# Patient Record
Sex: FEMALE | Race: WHITE | Employment: OTHER | URBAN - METROPOLITAN AREA
[De-identification: names, ages, dates, MRNs, and addresses within clinical notes are randomized per-mention and may not be internally consistent; named-entity substitution may affect disease eponyms.]

---

## 2017-04-10 PROBLEM — R60.9 EDEMA: Status: ACTIVE | Noted: 2017-04-10

## 2017-04-10 PROBLEM — R73.9 HYPERGLYCEMIA: Status: ACTIVE | Noted: 2017-04-10

## 2017-04-10 PROBLEM — Z00.00 MEDICARE ANNUAL WELLNESS VISIT, SUBSEQUENT: Status: ACTIVE | Noted: 2017-04-10

## 2017-04-10 PROBLEM — I10 ESSENTIAL HYPERTENSION: Status: ACTIVE | Noted: 2017-04-10

## 2017-04-28 PROCEDURE — 99283 EMERGENCY DEPT VISIT LOW MDM: CPT | Performed by: EMERGENCY MEDICINE

## 2017-04-29 ENCOUNTER — HOSPITAL ENCOUNTER (EMERGENCY)
Age: 82
Discharge: HOME | End: 2017-04-29
Attending: EMERGENCY MEDICINE
Payer: MEDICARE

## 2017-04-29 ENCOUNTER — APPOINTMENT (OUTPATIENT)
Dept: CT IMAGING | Age: 82
End: 2017-04-29
Attending: EMERGENCY MEDICINE
Payer: MEDICARE

## 2017-04-29 VITALS
SYSTOLIC BLOOD PRESSURE: 152 MMHG | WEIGHT: 95 LBS | HEART RATE: 75 BPM | HEIGHT: 62 IN | RESPIRATION RATE: 20 BRPM | OXYGEN SATURATION: 95 % | DIASTOLIC BLOOD PRESSURE: 67 MMHG | TEMPERATURE: 98 F | BODY MASS INDEX: 17.48 KG/M2

## 2017-04-29 DIAGNOSIS — H81.10 BENIGN POSITIONAL VERTIGO, UNSPECIFIED LATERALITY: Primary | ICD-10-CM

## 2017-04-29 PROCEDURE — 70450 CT HEAD/BRAIN W/O DYE: CPT

## 2017-04-29 PROCEDURE — 74011250636 HC RX REV CODE- 250/636: Performed by: EMERGENCY MEDICINE

## 2017-04-29 RX ORDER — SODIUM CHLORIDE 0.9 % (FLUSH) 0.9 %
5-10 SYRINGE (ML) INJECTION EVERY 8 HOURS
Status: DISCONTINUED | OUTPATIENT
Start: 2017-04-29 | End: 2017-04-29 | Stop reason: HOSPADM

## 2017-04-29 RX ORDER — SODIUM CHLORIDE 0.9 % (FLUSH) 0.9 %
5-10 SYRINGE (ML) INJECTION AS NEEDED
Status: DISCONTINUED | OUTPATIENT
Start: 2017-04-29 | End: 2017-04-29 | Stop reason: HOSPADM

## 2017-04-29 RX ORDER — MECLIZINE HYDROCHLORIDE 25 MG/1
25 TABLET ORAL
Status: COMPLETED | OUTPATIENT
Start: 2017-04-29 | End: 2017-04-29

## 2017-04-29 RX ORDER — MECLIZINE HYDROCHLORIDE 25 MG/1
25 TABLET ORAL
Qty: 30 TAB | Refills: 0 | Status: SHIPPED | OUTPATIENT
Start: 2017-04-29 | End: 2018-10-15

## 2017-04-29 RX ADMIN — MECLIZINE HYDROCHLORIDE 25 MG: 25 TABLET ORAL at 02:21

## 2017-04-29 NOTE — ED NOTES
I have reviewed discharge instructions with the patient. The patient verbalized understanding. Prescriptions provided x1. Pt ambulatory at discharge with family.

## 2017-04-29 NOTE — ED PROVIDER NOTES
HPI Comments: Patient presents to the emerge department with a about a week history of vertigo. She reports symptoms of been intermittent but occurring daily and though increased frequency and intensity today. Symptoms are described as spinning which causes nausea and diaphoresis and is provoked with changes in head position such as bending over or standing up. She denies any headaches double vision lateralizing neurological complaints or disequilibrium. Patient is a 80 y.o. female presenting with dizziness. The history is provided by the patient. Dizziness   This is a new problem. The current episode started more than 1 week ago. The problem has been gradually worsening. There was no focality noted. Pertinent negatives include no focal weakness, no loss of sensation, no loss of balance, no slurred speech, no speech difficulty, no memory loss, no movement disorder, no agitation, no visual change, no mental status change, no unresponsiveness and no disorientation. There has been no fever. Pertinent negatives include no shortness of breath, no chest pain, no vomiting, no altered mental status, no confusion, no headaches, no choking, no nausea, no bowel incontinence and no bladder incontinence. There were no medications administered prior to arrival.        Past Medical History:   Diagnosis Date    Carotid artery disease ([facility]     GERD (gastroesophageal reflux disease)     HTN (hypertension)     Hyperlipidemia     Hypomagnesemia     [facility]     effecting left side. 2002       History reviewed. No pertinent surgical history. History reviewed. No pertinent family history. Social History     Social History    Marital status:      Spouse name: N/A    Number of children: N/A    Years of education: N/A     Occupational History    Not on file.      Social History Main Topics    Smoking status: Never Smoker    Smokeless tobacco: Never Used    Alcohol use No    Drug use: No    Sexual activity: No     Other Topics Concern    Not on file     Social History Narrative         ALLERGIES: Penicillins    Review of Systems   HENT: Negative for ear discharge, ear pain, hearing loss, postnasal drip and tinnitus. Respiratory: Negative for choking and shortness of breath. Cardiovascular: Negative for chest pain. Gastrointestinal: Negative for bowel incontinence, nausea and vomiting. Genitourinary: Negative for bladder incontinence. Neurological: Positive for dizziness. Negative for tremors, focal weakness, seizures, syncope, facial asymmetry, speech difficulty, weakness, light-headedness, numbness, headaches and loss of balance. Psychiatric/Behavioral: Negative for agitation, confusion and memory loss. All other systems reviewed and are negative. Vitals:    04/29/17 0009 04/29/17 0203   BP: 181/77 187/77   Pulse: (!) 102 75   Resp: 20    Temp: 97.9 °F (36.6 °C)    SpO2: 98% 96%   Weight: 43.1 kg (95 lb)    Height: 5' 2\" (1.575 m)             Physical Exam   Constitutional: She is oriented to person, place, and time. She appears well-developed and well-nourished. No distress. HENT:   Head: Normocephalic and atraumatic. Mouth/Throat: No oropharyngeal exudate. Eyes: Conjunctivae and EOM are normal. Pupils are equal, round, and reactive to light. No nystagmus is noted at rest however having the patient sit up and lie supine and turning the head quickly results and her nystagmus with a fast component to the right, also provoking the symptoms of vertigo. Neck: Normal range of motion. Neck supple. Cardiovascular: Normal rate, regular rhythm and normal heart sounds. Pulmonary/Chest: Effort normal and breath sounds normal.   Abdominal: Soft. Bowel sounds are normal.   Musculoskeletal: Normal range of motion. Neurological: She is alert and oriented to person, place, and time. Skin: Skin is warm and dry. Psychiatric: She has a normal mood and affect.  Her behavior is normal. Nursing note and vitals reviewed.        MDM  Number of Diagnoses or Management Options  Benign positional vertigo, unspecified laterality:      Amount and/or Complexity of Data Reviewed  Tests in the radiology section of CPT®: ordered and reviewed    Risk of Complications, Morbidity, and/or Mortality  Presenting problems: moderate  Diagnostic procedures: moderate  Management options: low    Patient Progress  Patient progress: stable    ED Course       Procedures

## 2017-04-29 NOTE — ED TRIAGE NOTES
Dizziness and nausea when lying back. States dizziness when leaning over also. States she has noticed same x 4 days. Also began vitamin D3 6 days ago.

## 2017-10-09 PROBLEM — Z23 FLU VACCINE NEED: Status: ACTIVE | Noted: 2017-10-09

## 2018-02-05 PROBLEM — R05.9 COUGH: Status: ACTIVE | Noted: 2018-02-05

## 2018-02-05 PROBLEM — J06.9 ACUTE URI: Status: ACTIVE | Noted: 2018-02-05

## 2018-02-05 PROBLEM — S41.112A LACERATION OF LEFT UPPER EXTREMITY: Status: ACTIVE | Noted: 2018-02-05

## 2018-07-24 PROBLEM — M54.50 ACUTE LOW BACK PAIN WITHOUT SCIATICA: Status: ACTIVE | Noted: 2018-07-24

## 2018-07-24 PROBLEM — G89.29 CHRONIC SI JOINT PAIN: Status: ACTIVE | Noted: 2018-07-24

## 2018-07-24 PROBLEM — M53.3 CHRONIC SI JOINT PAIN: Status: ACTIVE | Noted: 2018-07-24

## 2018-10-15 PROBLEM — M54.40 CHRONIC RIGHT-SIDED LOW BACK PAIN WITH SCIATICA: Status: ACTIVE | Noted: 2018-10-15

## 2018-10-15 PROBLEM — G89.29 CHRONIC RIGHT-SIDED LOW BACK PAIN WITH SCIATICA: Status: ACTIVE | Noted: 2018-10-15

## 2018-12-06 PROBLEM — M48.062 SPINAL STENOSIS OF LUMBAR REGION WITH NEUROGENIC CLAUDICATION: Status: ACTIVE | Noted: 2018-12-06

## 2019-03-29 PROBLEM — L65.9 HAIR LOSS: Status: ACTIVE | Noted: 2019-03-29

## 2019-06-16 ENCOUNTER — HOSPITAL ENCOUNTER (EMERGENCY)
Age: 84
Discharge: HOME | End: 2019-06-16
Attending: EMERGENCY MEDICINE
Payer: MEDICARE

## 2019-06-16 VITALS
SYSTOLIC BLOOD PRESSURE: 153 MMHG | HEIGHT: 60 IN | DIASTOLIC BLOOD PRESSURE: 67 MMHG | WEIGHT: 98 LBS | BODY MASS INDEX: 19.24 KG/M2 | OXYGEN SATURATION: 97 % | TEMPERATURE: 97.6 F | HEART RATE: 100 BPM | RESPIRATION RATE: 18 BRPM

## 2019-06-16 DIAGNOSIS — N39.0 URINARY TRACT INFECTION WITHOUT HEMATURIA, SITE UNSPECIFIED: Primary | ICD-10-CM

## 2019-06-16 DIAGNOSIS — N81.10 BLADDER PROLAPSE, FEMALE, ACQUIRED: ICD-10-CM

## 2019-06-16 LAB
BACTERIA URNS QL MICRO: ABNORMAL /HPF
CASTS URNS QL MICRO: ABNORMAL /LPF
EPI CELLS #/AREA URNS HPF: ABNORMAL /HPF
RBC #/AREA URNS HPF: ABNORMAL /HPF
WBC URNS QL MICRO: >100 /HPF

## 2019-06-16 PROCEDURE — 81003 URINALYSIS AUTO W/O SCOPE: CPT | Performed by: EMERGENCY MEDICINE

## 2019-06-16 PROCEDURE — 99283 EMERGENCY DEPT VISIT LOW MDM: CPT | Performed by: EMERGENCY MEDICINE

## 2019-06-16 PROCEDURE — 81001 URINALYSIS AUTO W/SCOPE: CPT

## 2019-06-16 RX ORDER — NITROFURANTOIN 25; 75 MG/1; MG/1
100 CAPSULE ORAL 2 TIMES DAILY
Qty: 10 CAP | Refills: 0 | Status: SHIPPED | OUTPATIENT
Start: 2019-06-16 | End: 2019-06-21

## 2019-06-16 NOTE — DISCHARGE INSTRUCTIONS

## 2019-06-16 NOTE — ED NOTES
I have reviewed discharge instructions with the patient. The patient verbalized understanding. Patient left ED via Discharge Method: ambulatory to Home with daughter. Opportunity for questions and clarification provided. Patient given 1 scripts. To continue your aftercare when you leave the hospital, you may receive an automated call from our care team to check in on how you are doing. This is a free service and part of our promise to provide the best care and service to meet your aftercare needs.  If you have questions, or wish to unsubscribe from this service please call [phone]. Thank you for Choosing our [address] [place] [address] Emergency Department.

## 2019-06-16 NOTE — ED PROVIDER NOTES
25-year-old  female presents to [facility], complaining of bladder prolapse over the last couple of weeks, slowly getting worse. Patient denies any dysuria but does describe some mild increased frequency. She is status post hysterectomy many years ago. She denies any change in bowel habits, melena or hematochezia. The history is provided by the patient and a relative. Other   This is a new problem. The current episode started more than 1 week ago. The problem occurs daily. The problem has been gradually worsening. Pertinent negatives include no chest pain, no abdominal pain, no headaches and no shortness of breath. The symptoms are aggravated by standing and walking. Nothing relieves the symptoms. She has tried nothing for the symptoms. The treatment provided no relief. Past Medical History:   Diagnosis Date    Arthritis     Carotid artery disease (HCC)     Chronic pain     GERD (gastroesophageal reflux disease)     HTN (hypertension)     Hyperlipidemia     Hypomagnesemia     [facility]     effecting left side.   2002       Past Surgical History:   Procedure Laterality Date    BREAST SURGERY PROCEDURE UNLISTED      HX CHOLECYSTECTOMY           Family History:   Problem Relation Age of Onset    Arthritis-osteo Mother     Hypertension Mother        Social History     Socioeconomic History    Marital status:      Spouse name: Not on file    Number of children: Not on file    Years of education: Not on file    Highest education level: Not on file   Occupational History    Not on file   Social Needs    Financial resource strain: Not on file    Food insecurity:     Worry: Not on file     Inability: Not on file    Transportation needs:     Medical: Not on file     Non-medical: Not on file   Tobacco Use    Smoking status: Never Smoker    Smokeless tobacco: Never Used   Substance and Sexual Activity    Alcohol use: No    Drug use: No    Sexual activity: Never   Lifestyle  Physical activity:     Days per week: Not on file     Minutes per session: Not on file    Stress: Not on file   Relationships    Social connections:     Talks on phone: Not on file     Gets together: Not on file     Attends Rastafari service: Not on file     Active member of club or organization: Not on file     Attends meetings of clubs or organizations: Not on file     Relationship status: Not on file    Intimate partner violence:     Fear of current or ex partner: Not on file     Emotionally abused: Not on file     Physically abused: Not on file     Forced sexual activity: Not on file   Other Topics Concern    Not on file   Social History Narrative    Not on file         ALLERGIES: Latex and Penicillins    Review of Systems   Constitutional: Negative for chills and fever. Respiratory: Negative for shortness of breath. Cardiovascular: Negative for chest pain. Gastrointestinal: Negative for abdominal pain. Genitourinary: Positive for frequency. Negative for dysuria and vaginal pain. Neurological: Negative for headaches. All other systems reviewed and are negative. Vitals:    06/16/19 1110   BP: 153/67   Pulse: 100   Resp: 18   Temp: 97.6 °F (36.4 °C)   SpO2: 97%   Weight: 44.5 kg (98 lb)   Height: 5' (1.524 m)            Physical Exam   Constitutional: She is oriented to person, place, and time. She appears well-developed and well-nourished. No distress. HENT:   Head: Normocephalic and atraumatic. Right Ear: External ear normal.   Left Ear: External ear normal.   Mouth/Throat: Oropharynx is clear and moist.   Eyes: Pupils are equal, round, and reactive to light. Conjunctivae and EOM are normal.   Neck: Normal range of motion. Neck supple. No thyromegaly present. Cardiovascular: Normal rate, regular rhythm, normal heart sounds and intact distal pulses. Pulmonary/Chest: Effort normal and breath sounds normal.   Abdominal: Soft.  Bowel sounds are normal. She exhibits no distension and no mass. There is no tenderness. There is no rebound and no guarding. No hernia. Genitourinary:   Genitourinary Comments: No evidence of flow prolapse outside of the external vaginal area. Positive internal prolapse of bladder   Musculoskeletal: Normal range of motion. She exhibits no edema. Neurological: She is alert and oriented to person, place, and time. Skin: Skin is warm and dry. Capillary refill takes less than 2 seconds. Psychiatric: She has a normal mood and affect. Nursing note and vitals reviewed. MDM  Number of Diagnoses or Management Options  Bladder prolapse, female, acquired: new and requires workup  Urinary tract infection without hematuria, site unspecified: new and requires workup     Amount and/or Complexity of Data Reviewed  Clinical lab tests: ordered and reviewed  Review and summarize past medical records: yes    Risk of Complications, Morbidity, and/or Mortality  Presenting problems: low  Diagnostic procedures: minimal  Management options: low    Patient Progress  Patient progress: stable         Procedures    Results Reviewed:      Recent Results (from the past 24 hour(s))   URINE MICROSCOPIC    Collection Time: 06/16/19 11:51 AM   Result Value Ref Range    WBC >100 (H) 0 /hpf    RBC 0-3 0 /hpf    Epithelial cells 0-3 0 /hpf    Bacteria 4+ (H) 0 /hpf    Casts 5-10 0 /lpf       I discussed the results of all labs, procedures, radiographs, and treatments with the patient and available family. Treatment plan is agreed upon and the patient is ready for discharge. All voiced understanding of the discharge plan and medication instructions or changes as appropriate. Questions about treatment in the ED were answered. All were encouraged to return should symptoms worsen or new problems develop.

## 2019-06-16 NOTE — ED TRIAGE NOTES
Pt states her bladder prolapsed a couple weeks ago and has since had worsening incontinence of urine. Denies dysuria or hematuria. States this has never happened before and has no urologic history. Denies pain to area. Denies vomiting or fever.

## 2019-09-24 PROBLEM — Z23 FLU VACCINE NEED: Status: RESOLVED | Noted: 2017-10-09 | Resolved: 2019-09-24

## 2019-10-15 PROBLEM — Z86.79 HISTORY OF CAROTID ARTERY DISEASE: Status: ACTIVE | Noted: 2019-10-15

## 2019-10-15 PROBLEM — R35.0 URINARY FREQUENCY: Status: ACTIVE | Noted: 2019-10-15

## 2022-03-18 PROBLEM — S41.112A LACERATION OF LEFT UPPER EXTREMITY: Status: ACTIVE | Noted: 2018-02-05

## 2022-03-18 PROBLEM — R73.9 HYPERGLYCEMIA: Status: ACTIVE | Noted: 2017-04-10

## 2022-03-18 PROBLEM — Z23 FLU VACCINE NEED: Status: ACTIVE | Noted: 2017-10-09

## 2022-03-18 PROBLEM — R35.0 URINARY FREQUENCY: Status: ACTIVE | Noted: 2019-10-15

## 2022-03-19 PROBLEM — M53.3 CHRONIC SI JOINT PAIN: Status: ACTIVE | Noted: 2018-07-24

## 2022-03-19 PROBLEM — M54.50 ACUTE LOW BACK PAIN WITHOUT SCIATICA: Status: ACTIVE | Noted: 2018-07-24

## 2022-03-19 PROBLEM — M48.062 SPINAL STENOSIS OF LUMBAR REGION WITH NEUROGENIC CLAUDICATION: Status: ACTIVE | Noted: 2018-12-06

## 2022-03-19 PROBLEM — Z86.79 HISTORY OF CAROTID ARTERY DISEASE: Status: ACTIVE | Noted: 2019-10-15

## 2022-03-19 PROBLEM — Z00.00 MEDICARE ANNUAL WELLNESS VISIT, SUBSEQUENT: Status: ACTIVE | Noted: 2017-04-10

## 2022-03-19 PROBLEM — I10 ESSENTIAL HYPERTENSION: Status: ACTIVE | Noted: 2017-04-10

## 2022-03-19 PROBLEM — L65.9 HAIR LOSS: Status: ACTIVE | Noted: 2019-03-29

## 2022-03-19 PROBLEM — G89.29 CHRONIC SI JOINT PAIN: Status: ACTIVE | Noted: 2018-07-24

## 2022-03-19 PROBLEM — G89.29 CHRONIC RIGHT-SIDED LOW BACK PAIN WITH SCIATICA: Status: ACTIVE | Noted: 2018-10-15

## 2022-03-19 PROBLEM — M54.40 CHRONIC RIGHT-SIDED LOW BACK PAIN WITH SCIATICA: Status: ACTIVE | Noted: 2018-10-15

## 2022-03-19 PROBLEM — R05.9 COUGH: Status: ACTIVE | Noted: 2018-02-05

## 2022-03-19 PROBLEM — J06.9 ACUTE URI: Status: ACTIVE | Noted: 2018-02-05

## 2022-03-19 PROBLEM — R60.9 EDEMA: Status: ACTIVE | Noted: 2017-04-10

## 2022-07-03 ENCOUNTER — APPOINTMENT (OUTPATIENT)
Dept: CT IMAGING | Age: 87
DRG: 544 | End: 2022-07-03
Payer: MEDICARE

## 2022-07-03 ENCOUNTER — APPOINTMENT (OUTPATIENT)
Dept: GENERAL RADIOLOGY | Age: 87
DRG: 544 | End: 2022-07-03
Payer: MEDICARE

## 2022-07-03 ENCOUNTER — HOSPITAL ENCOUNTER (INPATIENT)
Age: 87
LOS: 3 days | Discharge: NURSING, REHAB OR LONG-TERM CARE | DRG: 544 | End: 2022-07-07
Attending: STUDENT IN AN ORGANIZED HEALTH CARE EDUCATION/TRAINING PROGRAM
Payer: MEDICARE

## 2022-07-03 DIAGNOSIS — M80.051D PATHOLOGICAL FRACTURE OF RIGHT HIP DUE TO AGE-RELATED OSTEOPOROSIS WITH ROUTINE HEALING: ICD-10-CM

## 2022-07-03 DIAGNOSIS — M84.48XA SACRAL INSUFFICIENCY FRACTURE, INITIAL ENCOUNTER: Primary | ICD-10-CM

## 2022-07-03 PROBLEM — S72.141D: Status: ACTIVE | Noted: 2020-11-30

## 2022-07-03 PROBLEM — R63.6 UNDERWEIGHT: Status: ACTIVE | Noted: 2020-11-30

## 2022-07-03 PROBLEM — R35.0 URINARY FREQUENCY: Status: RESOLVED | Noted: 2019-10-15 | Resolved: 2022-07-03

## 2022-07-03 PROBLEM — I65.23 BILATERAL CAROTID ARTERY STENOSIS: Status: ACTIVE | Noted: 2017-05-30

## 2022-07-03 PROBLEM — Z00.00 MEDICARE ANNUAL WELLNESS VISIT, SUBSEQUENT: Status: RESOLVED | Noted: 2017-04-10 | Resolved: 2022-07-03

## 2022-07-03 PROBLEM — M54.50 ACUTE LOW BACK PAIN WITHOUT SCIATICA: Status: RESOLVED | Noted: 2018-07-24 | Resolved: 2022-07-03

## 2022-07-03 PROBLEM — J06.9 ACUTE URI: Status: RESOLVED | Noted: 2018-02-05 | Resolved: 2022-07-03

## 2022-07-03 PROBLEM — G89.29 CHRONIC RIGHT-SIDED LOW BACK PAIN WITH SCIATICA: Status: ACTIVE | Noted: 2018-10-15

## 2022-07-03 PROBLEM — I10 ESSENTIAL HYPERTENSION: Status: ACTIVE | Noted: 2017-04-10

## 2022-07-03 PROBLEM — R05.9 COUGH: Status: RESOLVED | Noted: 2018-02-05 | Resolved: 2022-07-03

## 2022-07-03 PROBLEM — C44.311 BASAL CELL CARCINOMA OF NOSE: Status: ACTIVE | Noted: 2022-03-02

## 2022-07-03 PROBLEM — L65.9 HAIR LOSS: Status: RESOLVED | Noted: 2019-03-29 | Resolved: 2022-07-03

## 2022-07-03 PROBLEM — M54.40 CHRONIC RIGHT-SIDED LOW BACK PAIN WITH SCIATICA: Status: ACTIVE | Noted: 2018-10-15

## 2022-07-03 PROBLEM — S41.112A LACERATION OF LEFT UPPER EXTREMITY: Status: RESOLVED | Noted: 2018-02-05 | Resolved: 2022-07-03

## 2022-07-03 PROBLEM — R73.9 HYPERGLYCEMIA: Status: RESOLVED | Noted: 2017-04-10 | Resolved: 2022-07-03

## 2022-07-03 PROCEDURE — 99285 EMERGENCY DEPT VISIT HI MDM: CPT

## 2022-07-03 PROCEDURE — 72100 X-RAY EXAM L-S SPINE 2/3 VWS: CPT

## 2022-07-03 PROCEDURE — 73502 X-RAY EXAM HIP UNI 2-3 VIEWS: CPT

## 2022-07-03 PROCEDURE — 6370000000 HC RX 637 (ALT 250 FOR IP): Performed by: PHYSICIAN ASSISTANT

## 2022-07-03 PROCEDURE — G0378 HOSPITAL OBSERVATION PER HR: HCPCS

## 2022-07-03 PROCEDURE — 73700 CT LOWER EXTREMITY W/O DYE: CPT

## 2022-07-03 PROCEDURE — 2500000003 HC RX 250 WO HCPCS: Performed by: PHYSICIAN ASSISTANT

## 2022-07-03 PROCEDURE — 96372 THER/PROPH/DIAG INJ SC/IM: CPT

## 2022-07-03 RX ORDER — ATORVASTATIN CALCIUM 10 MG/1
10 TABLET, FILM COATED ORAL NIGHTLY
Status: DISCONTINUED | OUTPATIENT
Start: 2022-07-03 | End: 2022-07-07 | Stop reason: HOSPADM

## 2022-07-03 RX ORDER — POLYETHYLENE GLYCOL 3350 17 G/17G
17 POWDER, FOR SOLUTION ORAL DAILY
Status: DISCONTINUED | OUTPATIENT
Start: 2022-07-04 | End: 2022-07-07 | Stop reason: HOSPADM

## 2022-07-03 RX ORDER — SODIUM CHLORIDE 0.9 % (FLUSH) 0.9 %
5-40 SYRINGE (ML) INJECTION PRN
Status: DISCONTINUED | OUTPATIENT
Start: 2022-07-03 | End: 2022-07-07 | Stop reason: HOSPADM

## 2022-07-03 RX ORDER — MAGNESIUM HYDROXIDE/ALUMINUM HYDROXICE/SIMETHICONE 120; 1200; 1200 MG/30ML; MG/30ML; MG/30ML
30 SUSPENSION ORAL EVERY 6 HOURS PRN
Status: DISCONTINUED | OUTPATIENT
Start: 2022-07-03 | End: 2022-07-07 | Stop reason: HOSPADM

## 2022-07-03 RX ORDER — GABAPENTIN 100 MG/1
100 CAPSULE ORAL 3 TIMES DAILY PRN
Status: DISCONTINUED | OUTPATIENT
Start: 2022-07-03 | End: 2022-07-07 | Stop reason: HOSPADM

## 2022-07-03 RX ORDER — SODIUM CHLORIDE 0.9 % (FLUSH) 0.9 %
5-40 SYRINGE (ML) INJECTION EVERY 12 HOURS SCHEDULED
Status: DISCONTINUED | OUTPATIENT
Start: 2022-07-03 | End: 2022-07-07 | Stop reason: HOSPADM

## 2022-07-03 RX ORDER — ISOSORBIDE MONONITRATE 30 MG/1
30 TABLET, EXTENDED RELEASE ORAL DAILY
Status: DISCONTINUED | OUTPATIENT
Start: 2022-07-04 | End: 2022-07-07 | Stop reason: HOSPADM

## 2022-07-03 RX ORDER — LOSARTAN POTASSIUM 50 MG/1
50 TABLET ORAL DAILY
Status: DISCONTINUED | OUTPATIENT
Start: 2022-07-04 | End: 2022-07-07 | Stop reason: HOSPADM

## 2022-07-03 RX ORDER — ACETAMINOPHEN 325 MG/1
650 TABLET ORAL
Status: COMPLETED | OUTPATIENT
Start: 2022-07-03 | End: 2022-07-03

## 2022-07-03 RX ORDER — SODIUM CHLORIDE 9 MG/ML
INJECTION, SOLUTION INTRAVENOUS PRN
Status: DISCONTINUED | OUTPATIENT
Start: 2022-07-03 | End: 2022-07-07 | Stop reason: HOSPADM

## 2022-07-03 RX ORDER — ONDANSETRON 4 MG/1
4 TABLET, ORALLY DISINTEGRATING ORAL EVERY 8 HOURS PRN
Status: DISCONTINUED | OUTPATIENT
Start: 2022-07-03 | End: 2022-07-07 | Stop reason: HOSPADM

## 2022-07-03 RX ORDER — MORPHINE SULFATE 2 MG/ML
2 INJECTION, SOLUTION INTRAMUSCULAR; INTRAVENOUS
Status: COMPLETED | OUTPATIENT
Start: 2022-07-03 | End: 2022-07-03

## 2022-07-03 RX ORDER — HYDROCODONE BITARTRATE AND ACETAMINOPHEN 5; 325 MG/1; MG/1
1 TABLET ORAL EVERY 6 HOURS PRN
Status: DISCONTINUED | OUTPATIENT
Start: 2022-07-03 | End: 2022-07-07 | Stop reason: HOSPADM

## 2022-07-03 RX ORDER — ONDANSETRON 2 MG/ML
4 INJECTION INTRAMUSCULAR; INTRAVENOUS EVERY 6 HOURS PRN
Status: DISCONTINUED | OUTPATIENT
Start: 2022-07-03 | End: 2022-07-07 | Stop reason: HOSPADM

## 2022-07-03 RX ORDER — AMLODIPINE BESYLATE 10 MG/1
10 TABLET ORAL DAILY
Status: DISCONTINUED | OUTPATIENT
Start: 2022-07-04 | End: 2022-07-07 | Stop reason: HOSPADM

## 2022-07-03 RX ORDER — ENOXAPARIN SODIUM 100 MG/ML
40 INJECTION SUBCUTANEOUS DAILY
Status: DISCONTINUED | OUTPATIENT
Start: 2022-07-04 | End: 2022-07-04

## 2022-07-03 RX ORDER — GABAPENTIN 100 MG/1
100 CAPSULE ORAL 3 TIMES DAILY
Status: DISCONTINUED | OUTPATIENT
Start: 2022-07-03 | End: 2022-07-03

## 2022-07-03 RX ORDER — POLYETHYLENE GLYCOL 3350 17 G/17G
17 POWDER, FOR SOLUTION ORAL DAILY PRN
Status: DISCONTINUED | OUTPATIENT
Start: 2022-07-03 | End: 2022-07-07 | Stop reason: HOSPADM

## 2022-07-03 RX ADMIN — MORPHINE SULFATE 2 MG: 2 INJECTION, SOLUTION INTRAMUSCULAR; INTRAVENOUS at 22:01

## 2022-07-03 RX ADMIN — ACETAMINOPHEN 650 MG: 325 TABLET ORAL at 20:32

## 2022-07-03 ASSESSMENT — PAIN DESCRIPTION - LOCATION
LOCATION: HIP
LOCATION: HIP

## 2022-07-03 ASSESSMENT — PAIN SCALES - GENERAL
PAINLEVEL_OUTOF10: 10
PAINLEVEL_OUTOF10: 8
PAINLEVEL_OUTOF10: 0

## 2022-07-03 ASSESSMENT — PAIN DESCRIPTION - ORIENTATION: ORIENTATION: RIGHT

## 2022-07-03 ASSESSMENT — ENCOUNTER SYMPTOMS
GASTROINTESTINAL NEGATIVE: 1
RESPIRATORY NEGATIVE: 1

## 2022-07-03 NOTE — ED NOTES
Report to Cary Asher and Ronnie Carlson RN. They are to assume care of this pt at this time.       Izabela Britt RN  07/03/22 [id number]

## 2022-07-03 NOTE — ED TRIAGE NOTES
Pt arrives per EMS from home. Pt had fall yesterday. Missed getting onto a stool and fell hitting the concrete floor on butt. Complains of increasing right hip pain since fall. Is able to ambulate with assistance. No deformities noted.

## 2022-07-03 NOTE — ED PROVIDER NOTES
[facility] Emergency Department Provider Note                   PCP:                Kyle Welsh MD               Age: 80 y.o. Sex: female       ICD-10-CM    1. Sacral insufficiency fracture, initial encounter  M84.48XA        DISPOSITION Admitted 07/03/2022 11:04:13 PM       New Prescriptions    No medications on file       Orders Placed This Encounter   Procedures    XR HIP 2-3 VW W PELVIS RIGHT    XR LUMBAR SPINE (2-3 VIEWS)    CT HIP RIGHT WO CONTRAST    CBC with Auto Differential    Magnesium    Comprehensive Metabolic Panel    Urinalysis with Reflex to Culture    Vitamin D 25 Hydroxy    ADULT DIET; Easy to Chew    Vital signs per unit routine    Place intermittent pneumatic compression device    Insert russell catheter    Bedrest    Reposition every 2 hours    Daily weights    Intake and output    Elevate heels off of bed at all times    Full code    OT eval and treat    PT evaluation and treat    Initiate Oxygen Therapy Protocol    PLEASE READ & DOCUMENT PPD TEST IN 24 HRS    PLEASE READ & DOCUMENT PPD TEST IN 50 HRS    PLEASE READ & DOCUMENT PPD TEST IN 67 HRS    Place in Observation Service        [place], [place] 11:05 PM      MDM  Number of Diagnoses or Management Options  Sacral insufficiency fracture, initial encounter  Diagnosis management comments: Patient is a 75-year-old female who presents about 5 days after a fall. Complaining of right hip pain To the point where she cannot stand. Her son has to lift her and move her from place to place. Was found to have bilateral sacral insufficiency fractures, right worse than left. She was medicated with Tylenol and 2 mg of morphine. After the morphine her oxygen saturation dropped to upper 88/89% and low 90s. Patient still in pain and unable to even stand up. Hospitalist team consulted for admission.        Amount and/or Complexity of Data Reviewed  Discuss the patient with other providers: yes (Dr. Giuliano Aguila)    Risk of Complications, Morbidity, and/or Mortality  Presenting problems: moderate  Diagnostic procedures: moderate  Management options: moderate    Patient Progress  Patient progress: stable       Russell Deleon is a 80 y.o. female who presents to the Emergency Department with chief complaint of    Chief Complaint   Patient presents with    Hip Pain      Patient is a 80-year-old female who presents several days after a fall. She states she went to sit on a stool in her porch and the stool scooted out from under her and feet she fell directly onto her buttocks. States she did not hit her head or have any other injury. She says over the past several days the pain has been worsening. She has pain in her right buttock and right hip. She needs assistance to ambulate. She says she has history of sciatica sometimes the pain feels like her known sciatica. No back pain. She denies abdominal pain or chest pain. Review of Systems   Constitutional: Negative. HENT: Negative. Respiratory: Negative. Cardiovascular: Negative. Gastrointestinal: Negative. Genitourinary: Negative. Musculoskeletal: Positive for arthralgias. All other systems reviewed and are negative. Past Medical History:   Diagnosis Date    Arthritis     Carotid artery disease ([address]     Chicken pox     Chronic pain     GERD (gastroesophageal reflux disease)     HTN (hypertension)     Hyperlipidemia     Hypomagnesemia     Stroke ([facility]     effecting left side.   2002        Past Surgical History:   Procedure Laterality Date    BREAST SURGERY      CHOLECYSTECTOMY      HYSTERECTOMY, TOTAL ABDOMINAL (CERVIX REMOVED)          Family History   Problem Relation Age of Onset    Hypertension Mother     Osteoarthritis Mother            Social Connections:     Frequency of Communication with Friends and Family: Not on file    Frequency of Social Gatherings with Friends and Family: Not on file    Attends Caodaism Services: Not on normal.         Thought Content: Thought content normal.          Procedures      Labs Reviewed   CBC WITH AUTO DIFFERENTIAL   MAGNESIUM   COMPREHENSIVE METABOLIC PANEL   URINALYSIS WITH REFLEX TO CULTURE   VITAMIN D 25 HYDROXY   PLEASE READ & DOCUMENT PPD TEST IN 24 HRS        CT HIP RIGHT WO CONTRAST   Final Result      1. Bilateral sacral insufficiency fractures, more pronounced on the right. Osteopenia limits sensitivity. 2. Post surgical changes of the right femur. No acute hip fracture is seen. No   hip dislocation. 3. Moderate left hip joint osteoarthritis. 4. Overdistended urinary bladder. XR HIP 2-3 VW W PELVIS RIGHT   Final Result      1. Post surgical changes of the right femur. No acute fracture or dislocation of   the right hip. 2. Moderate to advanced left hip joint osteoarthritis. XR LUMBAR SPINE (2-3 VIEWS)   Final Result      1. No acute fracture or dislocation in the lumbar spine. 2. Degenerative changes. Voice dictation software was used during the making of this note. This software is not perfect and grammatical and other typographical errors may be present. This note has not been completely proofread for errors.        Wali Sanches  07/03/22 [id number]

## 2022-07-04 PROBLEM — R33.9 URINARY RETENTION: Status: ACTIVE | Noted: 2022-07-04

## 2022-07-04 LAB
25(OH)D3 SERPL-MCNC: 63.9 NG/ML (ref 30–100)
ALBUMIN SERPL-MCNC: 2.6 G/DL (ref 3.2–4.6)
ALBUMIN/GLOB SERPL: 0.8 {RATIO} (ref 1.2–3.5)
ALP SERPL-CCNC: 107 U/L (ref 50–136)
ALT SERPL-CCNC: 16 U/L (ref 12–65)
ANION GAP SERPL CALC-SCNC: 4 MMOL/L (ref 7–16)
AST SERPL-CCNC: 18 U/L (ref 15–37)
BASOPHILS # BLD: 0 K/UL (ref 0–0.2)
BASOPHILS NFR BLD: 0 % (ref 0–2)
BILIRUB SERPL-MCNC: 0.3 MG/DL (ref 0.2–1.1)
BUN SERPL-MCNC: 18 MG/DL (ref 8–23)
CALCIUM SERPL-MCNC: 8.9 MG/DL (ref 8.3–10.4)
CHLORIDE SERPL-SCNC: 105 MMOL/L (ref 98–107)
CO2 SERPL-SCNC: 30 MMOL/L (ref 21–32)
CREAT SERPL-MCNC: 0.8 MG/DL (ref 0.6–1)
DIFFERENTIAL METHOD BLD: ABNORMAL
EOSINOPHIL # BLD: 0.2 K/UL (ref 0–0.8)
EOSINOPHIL NFR BLD: 2 % (ref 0.5–7.8)
ERYTHROCYTE [DISTWIDTH] IN BLOOD BY AUTOMATED COUNT: 13.2 % (ref 11.9–14.6)
GLOBULIN SER CALC-MCNC: 3.2 G/DL (ref 2.3–3.5)
GLUCOSE SERPL-MCNC: 95 MG/DL (ref 65–100)
HCT VFR BLD AUTO: 34.4 % (ref 35.8–46.3)
HGB BLD-MCNC: 11.1 G/DL (ref 11.7–15.4)
IMM GRANULOCYTES # BLD AUTO: 0 K/UL (ref 0–0.5)
IMM GRANULOCYTES NFR BLD AUTO: 0 % (ref 0–5)
LYMPHOCYTES # BLD: 1.3 K/UL (ref 0.5–4.6)
LYMPHOCYTES NFR BLD: 15 % (ref 13–44)
MAGNESIUM SERPL-MCNC: 2 MG/DL (ref 1.8–2.4)
MCH RBC QN AUTO: 29 PG (ref 26.1–32.9)
MCHC RBC AUTO-ENTMCNC: 32.3 G/DL (ref 31.4–35)
MCV RBC AUTO: 89.8 FL (ref 79.6–97.8)
MONOCYTES # BLD: 0.8 K/UL (ref 0.1–1.3)
MONOCYTES NFR BLD: 10 % (ref 4–12)
NEUTS SEG # BLD: 6.1 K/UL (ref 1.7–8.2)
NEUTS SEG NFR BLD: 73 % (ref 43–78)
NRBC # BLD: 0 K/UL (ref 0–0.2)
PLATELET # BLD AUTO: 221 K/UL (ref 150–450)
PMV BLD AUTO: 10.7 FL (ref 9.4–12.3)
POTASSIUM SERPL-SCNC: 4.1 MMOL/L (ref 3.5–5.1)
PROT SERPL-MCNC: 5.8 G/DL (ref 6.3–8.2)
RBC # BLD AUTO: 3.83 M/UL (ref 4.05–5.2)
SODIUM SERPL-SCNC: 139 MMOL/L (ref 136–145)
WBC # BLD AUTO: 8.4 K/UL (ref 4.3–11.1)

## 2022-07-04 PROCEDURE — 6370000000 HC RX 637 (ALT 250 FOR IP): Performed by: FAMILY MEDICINE

## 2022-07-04 PROCEDURE — 6370000000 HC RX 637 (ALT 250 FOR IP): Performed by: INTERNAL MEDICINE

## 2022-07-04 PROCEDURE — 85025 COMPLETE CBC W/AUTO DIFF WBC: CPT

## 2022-07-04 PROCEDURE — G0378 HOSPITAL OBSERVATION PER HR: HCPCS

## 2022-07-04 PROCEDURE — 2500000003 HC RX 250 WO HCPCS: Performed by: FAMILY MEDICINE

## 2022-07-04 PROCEDURE — 97530 THERAPEUTIC ACTIVITIES: CPT

## 2022-07-04 PROCEDURE — 83735 ASSAY OF MAGNESIUM: CPT

## 2022-07-04 PROCEDURE — 97165 OT EVAL LOW COMPLEX 30 MIN: CPT

## 2022-07-04 PROCEDURE — 1100000000 HC RM PRIVATE

## 2022-07-04 PROCEDURE — 80053 COMPREHEN METABOLIC PANEL: CPT

## 2022-07-04 PROCEDURE — 97162 PT EVAL MOD COMPLEX 30 MIN: CPT

## 2022-07-04 PROCEDURE — 6360000002 HC RX W HCPCS: Performed by: FAMILY MEDICINE

## 2022-07-04 PROCEDURE — 36415 COLL VENOUS BLD VENIPUNCTURE: CPT

## 2022-07-04 PROCEDURE — 96372 THER/PROPH/DIAG INJ SC/IM: CPT

## 2022-07-04 PROCEDURE — 2580000003 HC RX 258: Performed by: FAMILY MEDICINE

## 2022-07-04 PROCEDURE — 82306 VITAMIN D 25 HYDROXY: CPT

## 2022-07-04 PROCEDURE — 99223 1ST HOSP IP/OBS HIGH 75: CPT | Performed by: ORTHOPAEDIC SURGERY

## 2022-07-04 PROCEDURE — 97535 SELF CARE MNGMENT TRAINING: CPT

## 2022-07-04 RX ORDER — HYDRALAZINE HYDROCHLORIDE 50 MG/1
25 TABLET, FILM COATED ORAL EVERY 4 HOURS PRN
Status: DISCONTINUED | OUTPATIENT
Start: 2022-07-04 | End: 2022-07-07 | Stop reason: HOSPADM

## 2022-07-04 RX ORDER — BISACODYL 10 MG
10 SUPPOSITORY, RECTAL RECTAL DAILY PRN
Status: DISCONTINUED | OUTPATIENT
Start: 2022-07-04 | End: 2022-07-06

## 2022-07-04 RX ORDER — HYDRALAZINE HYDROCHLORIDE 50 MG/1
50 TABLET, FILM COATED ORAL EVERY 4 HOURS PRN
Status: DISCONTINUED | OUTPATIENT
Start: 2022-07-04 | End: 2022-07-07 | Stop reason: HOSPADM

## 2022-07-04 RX ORDER — ASPIRIN 81 MG/1
81 TABLET, CHEWABLE ORAL DAILY
Status: DISCONTINUED | OUTPATIENT
Start: 2022-07-04 | End: 2022-07-04

## 2022-07-04 RX ORDER — ENOXAPARIN SODIUM 100 MG/ML
30 INJECTION SUBCUTANEOUS DAILY
Status: DISCONTINUED | OUTPATIENT
Start: 2022-07-04 | End: 2022-07-07 | Stop reason: HOSPADM

## 2022-07-04 RX ADMIN — POLYETHYLENE GLYCOL 3350 17 G: 17 POWDER, FOR SOLUTION ORAL at 08:26

## 2022-07-04 RX ADMIN — ATORVASTATIN CALCIUM 10 MG: 10 TABLET, FILM COATED ORAL at 00:33

## 2022-07-04 RX ADMIN — SODIUM CHLORIDE, PRESERVATIVE FREE 10 ML: 5 INJECTION INTRAVENOUS at 08:32

## 2022-07-04 RX ADMIN — ATORVASTATIN CALCIUM 10 MG: 10 TABLET, FILM COATED ORAL at 21:10

## 2022-07-04 RX ADMIN — SODIUM CHLORIDE, PRESERVATIVE FREE 10 ML: 5 INJECTION INTRAVENOUS at 22:10

## 2022-07-04 RX ADMIN — ISOSORBIDE MONONITRATE 30 MG: 30 TABLET, EXTENDED RELEASE ORAL at 08:26

## 2022-07-04 RX ADMIN — HYDROCODONE BITARTRATE AND ACETAMINOPHEN 1 TABLET: 5; 325 TABLET ORAL at 21:10

## 2022-07-04 RX ADMIN — ENOXAPARIN SODIUM 30 MG: 100 INJECTION SUBCUTANEOUS at 08:32

## 2022-07-04 RX ADMIN — SODIUM CHLORIDE, PRESERVATIVE FREE 5 ML: 5 INJECTION INTRAVENOUS at 00:38

## 2022-07-04 RX ADMIN — AMLODIPINE BESYLATE 10 MG: 10 TABLET ORAL at 08:26

## 2022-07-04 RX ADMIN — HYDRALAZINE HYDROCHLORIDE 25 MG: 50 TABLET, FILM COATED ORAL at 11:35

## 2022-07-04 RX ADMIN — LOSARTAN POTASSIUM 50 MG: 50 TABLET, FILM COATED ORAL at 08:26

## 2022-07-04 RX ADMIN — TUBERCULIN PURIFIED PROTEIN DERIVATIVE 5 UNITS: 5 INJECTION, SOLUTION INTRADERMAL at 00:33

## 2022-07-04 ASSESSMENT — PAIN SCALES - GENERAL
PAINLEVEL_OUTOF10: 4
PAINLEVEL_OUTOF10: 5
PAINLEVEL_OUTOF10: 5
PAINLEVEL_OUTOF10: 0

## 2022-07-04 ASSESSMENT — PAIN DESCRIPTION - ORIENTATION
ORIENTATION: RIGHT
ORIENTATION: RIGHT

## 2022-07-04 ASSESSMENT — PAIN DESCRIPTION - LOCATION
LOCATION: LEG
LOCATION: HEAD
LOCATION: LEG

## 2022-07-04 ASSESSMENT — PAIN - FUNCTIONAL ASSESSMENT: PAIN_FUNCTIONAL_ASSESSMENT: ACTIVITIES ARE NOT PREVENTED

## 2022-07-04 ASSESSMENT — PAIN DESCRIPTION - DESCRIPTORS: DESCRIPTORS: ACHING

## 2022-07-04 NOTE — THERAPY EVALUATION
PHYSICAL THERAPY Initial Assessment  (Link to Caseload Tracking: PT Visit Days : 1  Acknowledge Orders  Time In/Out  PT Charge Capture  Rehab Caseload Tracker    Sadi Toledo is a 80 y.o. female   PRIMARY DIAGNOSIS: Sacral insufficiency fracture  Sacral insufficiency fracture, initial encounter [Q78.95OY]       Reason for Referral:   Inpatient: Payor: MEDICARE / Plan: MEDICARE PART A AND B / Product Type: *No Product type* /     ASSESSMENT:     REHAB RECOMMENDATIONS:   Recommendation to date pending progress:  Setting:   Short-term Rehab    Equipment:     To Be Determined     ASSESSMENT:  Ms. Jonnie Huitron  is a 80year old female who presents after a fall with BL sacral insufficiency fractures. Ortho note recommends WBAT and pt is likely not surgical. This date pt performs mobility including bed mobility, transfers, standing balance activities, transfers, and ambulation with min-modAx2. Pt presents as functioning below her baseline, with deficits in mobility including transfers, gait, balance, and activity tolerance. Pt will benefit from skilled therapy services to address stated deficits to promote return to highest level of function, independence, and safety. Will continue to follow.        [employer] [facility] 6 Clicks Basic Mobility Inpatient Short Form  AM-PAC Mobility Inpatient   How much difficulty turning over in bed?: A Little  How much difficulty sitting down on / standing up from a chair with arms?: A Lot  How much difficulty moving from lying on back to sitting on side of bed?: A Little  How much help from another person moving to and from a bed to a chair?: A Lot  How much help from another person needed to walk in hospital room?: A Lot  How much help from another person for climbing 3-5 steps with a railing?: Total  AM-PAC Inpatient Mobility Raw Score : 13  AM-PAC Inpatient T-Scale Score : 36.74  Mobility Inpatient CMS 0-100% Score: 64.91  Mobility Inpatient CMS G-Code Modifier : CL    SUBJECTIVE: Ms. Danelle Severino states, Imelda Frank said I did really good at rehab last time\"     Social/Functional Receives Help From: Family  ADL Assistance: Independent  Homemaking Assistance: Independent  Homemaking Responsibilities: Yes  Ambulation Assistance: Independent  Transfer Assistance: Independent  Occupation: Retired   Lives alone but dtr is next door and currently staying with her. One story home with 3 [address] with railing.  Previously IND ADLs and walked with RW.    OBJECTIVE:     PAIN: Sohail Males / O2: Kimmy Bores / Anderson Lyme / Dimple Lowing:   Pre Treatment:   Pain Assessment: 0-10  Pain Level: 5  Pain Location: Leg  Pain Orientation: Right      Post Treatment: 5/10 Vitals        Oxygen    Room air Moss Catheter    RESTRICTIONS/PRECAUTIONS:  Restrictions/Precautions: Fall Risk,Weight Bearing (WBAT)  Right Lower Extremity Weight Bearing: Weight Bearing As Tolerated  Left Lower Extremity Weight Bearing: Weight Bearing As Tolerated              GROSS EVALUATION: Intact Impaired (Comments):   AROM [] RL limited by pain   PROM []    Strength []  LLE 4/5, RLE 3-/5   Balance [] Posture: Good  Sitting - Static: Good  Sitting - Dynamic: Fair,+  Standing - Static: Fair,+  Standing - Dynamic: Fair,-   Posture [] Forward Head  Rounded Shoulders  Thoracic Kyphosis   Sensation [x]     Coordination [x]      Tone [x]     Edema [x]    Activity Tolerance [] Patient limited by pain,Patient Tolerated treatment well    []      COGNITION/  PERCEPTION: Intact Impaired (Comments):   Orientation [x]     Vision [x]     Hearing [x]     Cognition  [x]       MOBILITY: I Mod I S SBA CGA Min Mod Max Total  NT x2 Comments:   Bed Mobility    Rolling [] [] [] [] [] [] [] [] [] [x] []    Supine to Sit [] [] [] [] [] [x] [] [] [] [] [x] Cuing to use bedrail and move LEs off bed   Scooting [] [] [] [] [] [] [] [] [] [x] []    Sit to Supine [] [] [] [] [] [] [] [] [] [x] []    Transfers    Sit to Stand [] [] [] [] [] [] [x] [] [] [] [x] Cuing for hand placement and anterior weightshifting   Bed to Chair [] [] [] [] [] [] [x] [] [] [] [x] Cuing for sequencing, SPT with RW   Stand to Sit [] [] [] [] [] [] [x] [] [] [] [x] Cuing for hand placement and controlled eccentric lowering    [] [] [] [] [] [] [] [] [] [] []    I=Independent, Mod I=Modified Independent, S=Supervision, SBA=Standby Assistance, CGA=Contact Guard Assistance,   Min=Minimal Assistance, Mod=Moderate Assistance, Max=Maximal Assistance, Total=Total Assistance, NT=Not Tested    GAIT: I Mod I S SBA CGA Min Mod Max Total  NT x2 Comments:   Level of Assistance [] [] [] [] [] [] [x] [] [] [] [x]    Distance 5 feet    DME Gait Belt and Rolling Walker    Gait Quality Antalgic, Decreased anival , Decreased step clearance, Decreased step length, Narrow base of support and Step-to    Weightbearing Status Restrictions/Precautions  Restrictions/Precautions: Fall Risk,Weight Bearing (WBAT)  Lower Extremity Weight Bearing Restrictions  Right Lower Extremity Weight Bearing: Weight Bearing As Tolerated  Left Lower Extremity Weight Bearing: Weight Bearing As Tolerated    Stairs      I=Independent, Mod I=Modified Independent, S=Supervision, SBA=Standby Assistance, CGA=Contact Guard Assistance,   Min=Minimal Assistance, Mod=Moderate Assistance, Max=Maximal Assistance, Total=Total Assistance, NT=Not Tested    PLAN:   ACUTE PHYSICAL THERAPY GOALS:   (Developed with and agreed upon by patient and/or caregiver.)    (1.) Brendon Farfan  will move from supine to sit and sit to supine  with CONTACT GUARD ASSIST within 7 treatment day(s). (2.) Brendon Farfan will transfer from bed to chair and chair to bed with MINIMAL ASSIST using the least restrictive device within 7 treatment day(s). (3.) Brendon Farfan will ambulate with MINIMAL ASSIST for 25 feet with the least restrictive device within 7 treatment day(s).    (4.) Brendon Farfan will perform standing static and dynamic balance activities x 5 minutes with CONTACT GUARD ASSIST to improve safety within 7 treatment day(s). (5.) Luis Medley will perform bilateral lower extremity exercises x 10 min for HEP with SUPERVISION to improve strength, endurance, and functional mobility within 7 treatment day(s). FREQUENCY AND DURATION: 3 times/week for duration of hospital stay or until stated goals are met, whichever comes first.    THERAPY PROGNOSIS: Good    PROBLEM LIST:   (Skilled intervention is medically necessary to address:)  Decreased ADL/Functional Activities  Decreased Activity Tolerance  Decreased AROM/PROM  Decreased Balance  Decreased Gait Ability  Decreased Safety Awareness  Decreased Strength  Decreased Transfer Abilities  Increased Pain INTERVENTIONS PLANNED:   (Benefits and precautions of physical therapy have been discussed with the patient.)  Therapeutic Activity  Therapeutic Exercise/HEP  Neuromuscular Re-education  Gait Training  Education       TREATMENT:   EVALUATION: MODERATE COMPLEXITY: (Untimed Charge)    TREATMENT:   Co-Treatment PT/OT necessary due to patient's decreased overall endurance/tolerance levels, as well as need for high level skilled assistance to complete functional transfers/mobility and functional tasks  Therapeutic Activity (12 Minutes): Therapeutic activity included Supine to Sit, Transfer Training, Ambulation on level ground, Sitting balance  and Standing balance to improve functional Activity tolerance, Balance, Mobility and Strength. TREATMENT GRID:  N/A    AFTER TREATMENT PRECAUTIONS: Call light within reach, Chair, Needs within reach, RN notified and Visitors at bedside    INTERDISCIPLINARY COLLABORATION:  RN/ PCT, PT/ PTA and OT/ MUNOZ    EDUCATION: Education Given To: Patient; Family  Education Provided: Role of Therapy;Plan of Care;Energy Conservation; Family Education;Equipment; Fall Prevention Strategies  Education Method: Verbal  Barriers to Learning: None  Education Outcome: Verbalized understanding    TIME IN/OUT:  Time In: 1111  Time Out: [address]  Minutes: [address], PT

## 2022-07-04 NOTE — PROGRESS NOTES
TRANSFER - IN REPORT:    Verbal report received from Seamus Huff on Cherrie Porter  being received from ED for routine progression of patient care      Report consisted of patient's Situation, Background, Assessment and   Recommendations(SBAR). Information from the following report(s) ED SBAR was reviewed with the receiving nurse. Opportunity for questions and clarification was provided. Assessment completed upon patient's arrival to unit and care assumed.

## 2022-07-04 NOTE — PLAN OF CARE
Problem: Discharge Planning  Goal: Discharge to home or other facility with appropriate resources  Outcome: Progressing     Problem: Pain  Goal: Verbalizes/displays adequate comfort level or baseline comfort level  7/4/2022 0836 by Clara Shankar RN  Outcome: Rose Richardson  7/4/2022 0152 by Sergio Tatum RN  Outcome: Progressing     Problem: Skin/Tissue Integrity  Goal: Absence of new skin breakdown  Description: 1. Monitor for areas of redness and/or skin breakdown  2. Assess vascular access sites hourly  3. Every 4-6 hours minimum:  Change oxygen saturation probe site  4. Every 4-6 hours:  If on nasal continuous positive airway pressure, respiratory therapy assess nares and determine need for appliance change or resting period.   7/4/2022 0836 by Clara Shankar RN  Outcome: Progressing  7/4/2022 0152 by Sergio Tatum RN  Outcome: Progressing     Problem: Safety - Adult  Goal: Free from fall injury  7/4/2022 0836 by Clara Shankar RN  Outcome: Progressing  Flowsheets (Taken 7/4/2022 2626)  Free From Fall Injury:   Ami Hampton family/caregiver on patient safety   Based on caregiver fall risk screen, instruct family/caregiver to ask for assistance with transferring infant if caregiver noted to have fall risk factors  7/4/2022 0152 by Sergio Tatum RN  Outcome: Progressing     Problem: ABCDS Injury Assessment  Goal: Absence of physical injury  Outcome: Progressing  Flowsheets (Taken 7/4/2022 0836)  Absence of Physical Injury: Implement safety measures based on patient assessment

## 2022-07-04 NOTE — PROGRESS NOTES
OCCUPATIONAL THERAPY Initial Assessment and Daily Note       OT Visit Days: 1  Acknowledge Orders  Time  OT Charge Capture  Rehab Caseload Tracker      Clary Kimble is a 80 y.o. female   PRIMARY DIAGNOSIS: Sacral insufficiency fracture  Sacral insufficiency fracture, initial encounter [E41.67MA]       Reason for Referral: Generalized Muscle Weakness (M62.81)  Other lack of cordination (R27.8)  Difficulty in walking, Not elsewhere classified (R26.2)  Inpatient: Payor: MEDICARE / Plan: MEDICARE PART A AND B / Product Type: *No Product type* /     ASSESSMENT:     REHAB RECOMMENDATIONS:   Recommendation to date pending progress:  Setting:   Short-term Rehab    Equipment:     To Be Determined     ASSESSMENT:  Ms. Giovana Aquino presented to the hospital with following a fall that resulted in bilateral sacral insufficiency fractures. Per ortho, pt is cleared to ambulate with RW. Non-op management at this time. At baseline, pt is independent for all ADLs and has assist as needed for her IADLs from her family. Today, pt was received supine in bed. Family present at bedside. Completed bed mobility with Mk x2. MaxA for  LB dressing. Sit>stand and t/f to chair modA x2 with RW. MaxA for toileting in standing. Pt painful throughout session but very agreeable to participate. Recommend STR at this time. Clary Kimble currently demonstrates overall deficits in strength, balance, activity tolerance, and ADL performance. Patient would benefit from skilled OT services at this time in order to address functional deficits and OT goals stated above.      [address] [id number] AM-PAC 6 Clicks Daily Activity Inpatient Short Form:    AM-PAC Daily Activity Inpatient   How much help for putting on and taking off regular lower body clothing?: A Lot  How much help for Bathing?: A Lot  How much help for Toileting?: A Lot  How much help for putting on and taking off regular upper body clothing?: A Little  How much help for taking care of personal grooming?: A Little  How much help for eating meals?: None  AM-PAC Inpatient Daily Activity Raw Score: 16  AM-PAC Inpatient ADL T-Scale Score : 35.96  ADL Inpatient CMS 0-100% Score: 53.32  ADL Inpatient CMS G-Code Modifier : CK      SUBJECTIVE:     Ms. Ailyn Clay states, \"I have one of those life alert necklaces. \"     Social/Functional Lives With: Alone (daughter next door)  Type of Home: House  Home Layout: One level ( steps)  Bathroom Shower/Tub: Tub/Shower unit (shower chair)  Receives Help From: Family (if needed)  ADL Assistance: [address]: [address] [place] [address] Responsibilities: Yes  Ambulation Assistance:  (ambulates with RW)  Transfer Assistance: Independent  Occupation: Retired    OBJECTIVE:     ZAIDA / Leon Burnett / AIRWAY: Moss Catheter    RESTRICTIONS/PRECAUTIONS:  Restrictions/Precautions: Fall [address] [facility] (WBAT)  Right Lower Extremity Weight Bearing: Weight Bearing As Tolerated  Left Lower Extremity Weight Bearing: Weight Bearing As Tolerated    PAIN: Maurizio Muskrat / O2:   Pre Treatment:   Pain Assessment: 0-10  Pain Level: 5  Pain Location: Leg  Pain Orientation: Right      Post Treatment: no change        Vitals    stable throughout session      Oxygen   room air          GROSS EVALUATION: INTACT IMPAIRED   (See Comments)   UE AROM [x] []   UE PROM [x] []   Strength []   generalized weakness BUEs   Posture / Balance []  fair+ sitting, fair standing    Sensation [x]     Coordination []       Tone [x]       Edema [x]    Activity Tolerance []  limited by pain      Hand Dominance R [] L []      COGNITION/  PERCEPTION: INTACT IMPAIRED   (See Comments)   Orientation [x]     Vision [x]     Hearing [x]     Cognition  [] Decreased safety awareness    Perception []       MOBILITY: I Mod I S SBA CGA Min Mod Max Total  NT x2 Comments:   Bed Mobility    Rolling [] [] [] [] [] [] [] [] [] [x] []    Supine to Sit [] [] [] [] [] [x] [] [] [] [] [x]    Scooting [] [] [] [] [] [x] [] [] [] [] []    Sit to Supine [] [] [] [] [] [x] [] [] [] [] [x]    Transfers    Sit to Stand [] [] [] [] [] [] [x] [] [] [] [x]    Bed to Chair [] [] [] [] [] [] [x] [] [] [] [x] RW   Stand to Sit [] [] [] [] [] [] [x] [] [] [] []    Tub/Shower [] [] [] [] [] [] [] [] [] [x] []     Toilet [] [] [] [] [] [] [] [] [] [x] []      [] [] [] [] [] [] [] [] [] [] []    I=Independent, Mod I=Modified Independent, S=Supervision/Setup, SBA=Standby Assistance, CGA=Contact Guard Assistance, Min=Minimal Assistance, Mod=Moderate Assistance, Max=Maximal Assistance, Total=Total Assistance, NT=Not Tested    ACTIVITIES OF DAILY LIVING: I Mod I S SBA CGA Min Mod Max Total NT Comments   BASIC ADLs:              Upper Body Bathing  [] [] [] [] [] [] [] [] [] [x]    Lower Body Bathing [] [] [] [] [] [] [] [] [] [x]    Toileting [] [] [] [] [] [] [] [x] [] [] Brief change and jordin-care in standing    Upper Body Dressing [] [] [] [] [] [] [] [] [] [x]    Lower Body Dressing [] [] [] [] [] [] [] [x] [] [] Socks    Feeding [] [] [] [] [] [] [] [] [] [x]    Grooming [] [] [] [] [] [] [] [] [] [x]    Personal Device Care [] [] [] [] [] [] [] [] [] [x]    Functional Mobility [] [] [] [] [] [] [x] [] [] [] x2   I=Independent, Mod I=Modified Independent, S=Supervision/Setup, SBA=Standby Assistance, CGA=Contact Guard Assistance, Min=Minimal Assistance, Mod=Moderate Assistance, Max=Maximal Assistance, Total=Total Assistance, NT=Not Tested    PLAN:     FREQUENCY/DURATION   OT Plan of Care: 3 times/week for duration of hospital stay or until stated goals are met, whichever comes first.    ACUTE OCCUPATIONAL THERAPY GOALS:   (Developed with and agreed upon by patient and/or caregiver.)  1. Patient will complete lower body bathing and dressing with MIN A and adaptive equipment as needed. 2.Patient will complete upper body bathing and dressing with MOD I and adaptive equipment as needed.   3. Patient will complete toileting with MIN A.   4. Patient will tolerate 25 minutes of OT treatment with 1-2 rest breaks to increase activity tolerance for ADLs. 5. Patient will complete functional transfers with CGA and adaptive equipment as needed. 6. Patient will complete functional activity with MOD I and adaptive equipment as needed. Timeframe: 7 visits     PROBLEM LIST:   (Skilled intervention is medically necessary to address:)  Decreased ADL/Functional Activities  Decreased Activity Tolerance  Decreased Balance  Decreased Coordination  Decreased Safety Awareness  Decreased Strength  Decreased Transfer Abilities  Increased Pain   INTERVENTIONS PLANNED:  (Benefits and precautions of occupational therapy have been discussed with the patient.)  Self Care Training  Therapeutic Activity  Therapeutic Exercise/HEP  Neuromuscular Re-education  Manual Therapy  Education         TREATMENT:     EVALUATION: LOW COMPLEXITY: (Untimed Charge)    TREATMENT:   Co-Treatment PT/OT necessary due to patient's decreased overall endurance/tolerance levels, as well as need for high level skilled assistance to complete functional transfers/mobility and functional tasks  Self Care (15 minutes): Patient participated in toileting and lower body dressing ADLs in unsupported sitting and standing with no   cueing to increase independence, decrease assistance required, increase activity tolerance and increase safety awareness. Patient also participated in functional mobility, functional transfer and energy conservation training to increase independence, decrease assistance required, increase activity tolerance and increase safety awareness. TREATMENT GRID:  N/A    AFTER TREATMENT PRECAUTIONS: Call light within reach, Chair, Needs within reach, RN notified and Visitors at bedside    INTERDISCIPLINARY COLLABORATION:  RN/ PCT, PT/ PTA and OT/ MUNOZ    EDUCATION:  Education Given To: Patient; Family  Education Provided: Role of Therapy;Plan of Care; Fall Prevention Strategies  Education Outcome: Verbalized understanding;Demonstrated understanding    TOTAL TREATMENT DURATION AND TIME:  Time In: 1111  Time Out: [address] [facility]  Minutes: [address] [facility], OT

## 2022-07-04 NOTE — PROGRESS NOTES
Hospitalist Progress Note   Admit Date:  7/3/2022  6:35 PM   Name:  Brett Blevins   Age:  80 y.o. Sex:  female  :  10/20/1928   MRN:  [id number]   Room:  [facility]/    Presenting Complaint: Hip Pain     Reason(s) for Admission: Sacral insufficiency fracture, initial encounter [facility] Course & Interval History:   Brett Blevins is a pleasant 80 y.o. 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 She went to sit on a stool in her porch and the stool scooted from under her and she fell onto her buttocks. No head injury or LOC. Found to have bilateral sacral insuffiency fractures, more pronounced on the right. CT also noted distended urinary bladder and large volume stool in the colon. Subjective/24hr Events (22): Pt says her sacral pain is intermittent; absent at rest, but has moderate pain when she moves around. No CP, SOB, fevers    Assessment & Plan:       Sacral insufficiency fracture  -ortho consulted  -norco PRN  -PT/OT      Urinary retention  -russell in place. Voiding trial tomorrow      Constipation  -miralax daily  -dulcolax prn      Pathological fracture of right hip due to age-related osteoporosis with routine healing  -s/p closed reduction and CMN fixation 10/30/2020      Carotid artery disease (HCC)    Mixed hyperlipidemia  -cont statin. Resume plavix if no surgery      Essential hypertension   -uncontrolled. Resume home meds      Discharge Planning:    -PPD ordered. CM consulted.   Needs placement    Diet:  ADULT DIET; Easy to Chew  DVT PPx: lovenox  Code status: Full Code    Hospital Problems:  Principal Problem:    Sacral insufficiency fracture  Active Problems:    Bilateral carotid artery stenosis    Closed comminuted intertrochanteric fracture of proximal end of femur with routine healing, right    Pathological fracture of right hip due to age-related osteoporosis with routine healing    Carotid artery disease ([address]    Mixed hyperlipidemia    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 *      Objective:     Patient Vitals for the past 24 hrs:   Temp Pulse Resp BP SpO2   07/04/22 0750 97.7 °F (36.5 °C) 97 18 (!) 160/75 96 %   07/04/22 0447 97.9 °F (36.6 °C) 84 16 (!) 149/63 96 %   07/04/22 0034 97.9 °F (36.6 °C) 96 18 (!) 143/52 93 %   07/03/22 2330 -- -- -- (!) 166/68 94 %   07/03/22 2300 -- -- -- (!) 158/75 96 %   07/03/22 2203 -- 93 16 -- 95 %   07/03/22 2200 -- -- -- (!) 167/70 --   07/03/22 2145 -- 94 16 (!) 142/64 96 %   07/03/22 2130 -- -- -- (!) 142/64 95 %   07/03/22 2000 -- -- -- (!) 143/64 96 %   07/03/22 1930 -- -- -- (!) 141/62 --   07/03/22 1904 -- -- -- -- 91 %   07/03/22 1830 98.7 °F (37.1 °C) 98 18 (!) 152/81 --       Oxygen Therapy  SpO2: 96 %  Pulse Oximeter Device Mode: Continuous  O2 Device: None (Room air)    Estimated body mass index is 18.52 kg/m² as calculated from the following:    Height as of this encounter: 5' 1\" (1.549 m). Weight as of this encounter: 98 lb (44.5 kg). Intake/Output Summary (Last 24 hours) at 7/4/2022 0857  Last data filed at 7/4/2022 0826  Gross per 24 hour   Intake 480 ml   Output 1350 ml   Net -870 ml         Physical Exam:     Blood pressure (!) 160/75, pulse 97, temperature 97.7 °F (36.5 °C), temperature source Axillary, resp. rate 18, height 5' 1\" (1.549 m), weight 98 lb (44.5 kg), SpO2 96 %. General:    Well nourished. Head:  Normocephalic, atraumatic  Eyes:  Sclerae appear normal.  Pupils equally round. ENT:  Nares appear normal, no drainage. Moist oral mucosa  Neck:  No restricted ROM. Trachea midline   CV:   RRR. No jugular venous distension.   Lungs: Respirations even, unlabored  Abdomen:   nondistended. Extremities: No cyanosis or clubbing. No edema  Skin:     No rashes and normal coloration. Warm and dry. Neuro:  CN II-XII grossly intact. Sensation intact. A&Ox3  Psych:  Normal mood and affect.       I have reviewed ordered lab tests and independently visualized imaging below:    Recent Labs:  Recent Results (from the past 48 hour(s))   CBC with Auto Differential    Collection Time: 07/04/22  4:28 AM   Result Value Ref Range    WBC 8.4 4.3 - 11.1 K/uL    RBC 3.83 (L) 4.05 - 5.2 M/uL    Hemoglobin 11.1 (L) 11.7 - 15.4 g/dL    Hematocrit 34.4 (L) 35.8 - 46.3 %    MCV 89.8 79.6 - 97.8 FL    MCH 29.0 26.1 - 32.9 PG    MCHC 32.3 31.4 - 35.0 g/dL    RDW 13.2 11.9 - 14.6 %    Platelets 333 181 - 204 K/uL    MPV 10.7 9.4 - 12.3 FL    nRBC 0.00 0.0 - 0.2 K/uL    Differential Type AUTOMATED      Seg Neutrophils 73 43 - 78 %    Lymphocytes 15 13 - 44 %    Monocytes 10 4.0 - 12.0 %    Eosinophils % 2 0.5 - 7.8 %    Basophils 0 0.0 - 2.0 %    Immature Granulocytes 0 0.0 - 5.0 %    Segs Absolute 6.1 1.7 - 8.2 K/UL    Absolute Lymph # 1.3 0.5 - 4.6 K/UL    Absolute Mono # 0.8 0.1 - 1.3 K/UL    Absolute Eos # 0.2 0.0 - 0.8 K/UL    Basophils Absolute 0.0 0.0 - 0.2 K/UL    Absolute Immature Granulocyte 0.0 0.0 - 0.5 K/UL   Magnesium    Collection Time: 07/04/22  4:28 AM   Result Value Ref Range    Magnesium 2.0 1.8 - 2.4 mg/dL   Comprehensive Metabolic Panel    Collection Time: 07/04/22  4:28 AM   Result Value Ref Range    Sodium 139 136 - 145 mmol/L    Potassium 4.1 3.5 - 5.1 mmol/L    Chloride 105 98 - 107 mmol/L    CO2 30 21 - 32 mmol/L    Anion Gap 4 (L) 7 - 16 mmol/L    Glucose 95 65 - 100 mg/dL    BUN 18 8 - 23 MG/DL    CREATININE 0.80 0.6 - 1.0 MG/DL    GFR African American >60 >60 ml/min/1.73m2    GFR Non- >60 >60 ml/min/1.73m2    Calcium 8.9 8.3 - 10.4 MG/DL    Total Bilirubin 0.3 0.2 - 1.1 MG/DL    ALT 16 12 - 65 U/L    AST 18 15 - 37 U/L Alk Phosphatase 107 50 - 136 U/L    Total Protein 5.8 (L) 6.3 - 8.2 g/dL    Albumin 2.6 (L) 3.2 - 4.6 g/dL    Globulin 3.2 2.3 - 3.5 g/dL    Albumin/Globulin Ratio 0.8 (L) 1.2 - 3.5     Vitamin D 25 Hydroxy    Collection Time: 07/04/22  4:28 AM   Result Value Ref Range    Vit D, 25-Hydroxy 63.9 30.0 - 100.0 ng/mL       Other Studies:  CT HIP RIGHT WO CONTRAST   Final Result      1. Bilateral sacral insufficiency fractures, more pronounced on the right. Osteopenia limits sensitivity. 2. Post surgical changes of the right femur. No acute hip fracture is seen. No   hip dislocation. 3. Moderate left hip joint osteoarthritis. 4. Overdistended urinary bladder. XR HIP 2-3 VW W PELVIS RIGHT   Final Result      1. Post surgical changes of the right femur. No acute fracture or dislocation of   the right hip. 2. Moderate to advanced left hip joint osteoarthritis. XR LUMBAR SPINE (2-3 VIEWS)   Final Result      1. No acute fracture or dislocation in the lumbar spine. 2. Degenerative changes.              Current Meds:  Current Facility-Administered Medications   Medication Dose Route Frequency    enoxaparin Sodium (LOVENOX) injection 30 mg  30 mg SubCUTAneous Daily    sodium chloride flush 0.9 % injection 5-40 mL  5-40 mL IntraVENous 2 times per day    sodium chloride flush 0.9 % injection 5-40 mL  5-40 mL IntraVENous PRN    0.9 % sodium chloride infusion   IntraVENous PRN    ondansetron (ZOFRAN-ODT) disintegrating tablet 4 mg  4 mg Oral Q8H PRN    Or    ondansetron (ZOFRAN) injection 4 mg  4 mg IntraVENous Q6H PRN    polyethylene glycol (GLYCOLAX) packet 17 g  17 g Oral Daily PRN    tuberculin injection 5 Units  5 Units IntraDERmal Once    aluminum & magnesium hydroxide-simethicone (MAALOX) 200-200-20 MG/5ML suspension 30 mL  30 mL Oral Q6H PRN    polyethylene glycol (GLYCOLAX) packet 17 g  17 g Oral Daily    HYDROcodone-acetaminophen (NORCO) 5-325 MG per tablet 1 tablet  1 tablet Oral Q6H PRN    amLODIPine (NORVASC) tablet 10 mg  10 mg Oral Daily    isosorbide mononitrate (IMDUR) extended release tablet 30 mg  30 mg Oral Daily    atorvastatin (LIPITOR) tablet 10 mg  10 mg Oral Nightly    losartan (COZAAR) tablet 50 mg  50 mg Oral Daily    gabapentin (NEURONTIN) capsule 100 mg  100 mg Oral TID PRN       Signed:  Rodrigo Weinstein MD    Part of this note may have been written by using a voice dictation software. The note has been proof read but may still contain some grammatical/other typographical errors.

## 2022-07-04 NOTE — ED NOTES
TRANSFER - OUT REPORT:    Verbal report given to ANGELLA Jimenez on Den Arana  being transferred to 7th Floor for routine progression of patient care       Report consisted of patient's Situation, Background, Assessment and   Recommendations(SBAR). Information from the following report(s) Nurse Handoff Report, ED Encounter Summary, Adult Overview, MAR and Recent Results was reviewed with the receiving nurse. Lines:   Peripheral IV 07/03/22 Left Antecubital (Active)   Site Assessment Clean, dry & intact 07/03/22 2354   Line Status Normal saline locked 07/03/22 2354       Peripheral IV 07/03/22 Right Forearm (Active)   Site Assessment Clean, dry & intact 07/03/22 2354   Line Status Normal saline locked 07/03/22 2354        Opportunity for questions and clarification was provided.       Patient transported with:  Pablo Ramirez RN  07/03/22 [id number]

## 2022-07-04 NOTE — PROGRESS NOTES
Consult for bilateral sacral insufficiency fractures    80year old WF fell at home about 5 days ago and no pain for 2 days. Then started having gradually worsening hip/pelvis pain until the point that she couldn't stand or walk secondary to the pain. She was brought to the ER by EMS where she was found to have bilateral sacral insufficiency fractures by CT scan. This would account for her pain and symptoms. She is alert and oriented and reports no pain laying in bed, but severe pain with weight bearing. Pain with bilateral hip range of motion. Treatment is Physical Therapy ambulation with walker as tolerated, but with bilateral fracture patient may be non weight bearing and non ambulatory for a prolonged period of time. She may require SNF because of non ambulatory status.

## 2022-07-04 NOTE — CARE COORDINATION
Pt is a 81 yo female admitted due to sacral insufficiency fractures she sustained in a fall at home. PT/OT evals pending and PPD placed today for STR admission. [facility]. Preliminary referral submitted. Therapy evals will be sent once available. COVID test will be ordered once bed offer is secured. CM following to facilitate pt's transfer to rehab at OR. 1212:  Pt has been accepted for admission to [facility] and can transfer there Thursday if she is medically cleared for OR. Pt notified of bed offer and plan and she is in agreement. 07/04/22 0958   Service Assessment   Patient Orientation Alert and Oriented   Cognition Alert   History Provided By Patient;Medical Record   Primary [address]   PCP Verified by CM Yes   Last Visit to PCP Within last 3 months   Prior Functional Level Independent in ADLs/IADLs   Current Functional Level Assistance with the following:;Bathing;Dressing; Toileting;Cooking;Housework; Mobility; Shopping   Can patient return to prior living arrangement No   Ability to make needs known: Good   Family able to assist with home care needs: Yes   Financial Resources Medicare   Social/Functional History   Receives Help From [address] [employer] Responsibilities Yes   Ambulation Assistance Independent   Transfer Assistance Independent   Occupation Retired   Discharge Planning   Type of [address] [place] [address] Prior To Admission None   [address]   DME Ordered? No   Potential Assistance Purchasing Medications No   Type of Home Care Services None   Patient expects to be discharged to: Skilled nursing facility   History of falls?  1   Services At/After Discharge   Transition of Care Consult (CM Consult) Discharge Planning;SNF   Services At/After Discharge Skilled

## 2022-07-04 NOTE — H&P
Hospitalist History and Physical   Admit Date:  7/3/2022  6:35 PM   Name:  Andrés Tapia   Age:  80 y.o. Sex:  female  :  10/20/1928   MRN:  [id number]   Room:  02/    Presenting Complaint: Hip Pain     Reason(s) for Admission: Sacral insufficiency fracture, initial encounter [N43.69JG]     History of Present Illness:   Andrés Tapia is a 80 y.o. 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 She went to sit on a stool in her porch and the stool scooted from under her and she fell onto her buttocks. No head injury or LOC. Found to have bilateral sacral insuffiency fractures, more pronounced on the right. No acute hip fracture found. CT also noted distended urinary bladder and large volume stool in the colon. rec'd morphine 2 mg in ED with some relief. Recently treated with bactrim for UTI. Son at bedside notes she cannot walk or bear weight on right leg. He has been carrying her and during transfers, she is having a lot of pain. Review of Systems:  10 systems reviewed and negative except as noted in HPI. Assessment & Plan:     Principal Problem:    Sacral insufficiency fracture, bilateral   Admit for observation. PT/OT. Ortho consult in AM. Likely non operable. PPD. IVF. Pain control. Check vitamin D level in AM.     Osteoporosis with current pathologic fracture - not on treatment. Check vit D   . Recent UTI - treated with bactrim. Noted to have distended urinary bladder. Check UA and obtain basic labs. Monitor bladder scans for retention. Treat constipation     Constipation - increasing risk of UTI. Start polyethylene glycol daily. HTN - BP stable. Unable to tell me her home meds. GERD - hodl PPI with osteoporosis and fractures.    Sciatica - trial prn gabapentin low dose   KEYONNA s/p endarectomy 2002. On prvastatin and plavix. Holding plavix pending ortho eval, likely non surgical fractures, restart in AM pending consult   History of CVA - 2002. Hold plavix. Resume statin. HTN - resume amldopine, losartan         Dispo/Discharge Planning:     Admit obs, likely SNF     Diet: ADULT DIET; Easy to Chew  VTE ppx: loveonx   Code status: Full Code    Hospital Problems:  Principal Problem:    Sacral insufficiency fracture  Active Problems:    Bilateral carotid artery stenosis    Closed comminuted intertrochanteric fracture of proximal end of femur with routine healing, right    Pathological fracture of right hip due to age-related osteoporosis with routine healing    Carotid artery disease (HCC)    Mixed hyperlipidemia    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 *       Past History:     Past Medical History:   Diagnosis Date    Arthritis     Carotid artery disease ([address]     Chicken pox     Chronic pain     GERD (gastroesophageal reflux disease)     HTN (hypertension)     Hyperlipidemia     Hypomagnesemia     Stroke [facility]     effecting left side. 2002     Past Surgical History:   Procedure Laterality Date    BREAST SURGERY      CHOLECYSTECTOMY      HYSTERECTOMY, TOTAL ABDOMINAL (CERVIX REMOVED)        Social History     Tobacco Use    Smoking status: Never Smoker    Smokeless tobacco: Never Used   Substance Use Topics    Alcohol use: No      Social History     Substance and Sexual Activity   Drug Use No     Family History   Problem Relation Age of Onset    Hypertension Mother     Osteoarthritis Mother       Family history reviewed and negative except as noted above.       Immunization History   Administered Date(s) Administered    COVID-19, PFIZER PURPLE top, DILUTE for use, (age 15 y+), 30mcg/0.3mL 03/22/2021, 04/14/2021    Influenza, MDCK Quadv, IM, PF (Flucelvax 2 yrs and older) 10/09/2017    Influenza, Quadv, IM, PF (6 mo and older Fluzone, Flulaval, Fluarix, and 3 yrs and older Afluria) 10/10/2016    Influenza, Triv, inactivated, subunit, adjuvanted, IM (Fluad 65 yrs and older) 10/15/2019    Pneumococcal Conjugate 13-valent ([id number]) 10/10/2016     Allergies   Allergen Reactions    Latex Other (See Comments)    Penicillins Other (See Comments)     Prior to Admit Medications:  Current Outpatient Medications   Medication Instructions    amLODIPine (NORVASC) 10 MG tablet TAKE 1 TABLET BY MOUTH DAILY    Cholecalciferol 50 MCG (2000 UT) TABS Oral    clopidogrel (PLAVIX) 75 MG tablet TAKE 1 TABLET BY MOUTH DAILY    cyclobenzaprine (FLEXERIL) 10 mg, Oral, 2 TIMES DAILY PRN    isosorbide mononitrate (IMDUR) 30 MG extended release tablet TAKE 1 TABLET BY MOUTH DAILY    losartan (COZAAR) 50 MG tablet TAKE 1 TABLET BY MOUTH DAILY    magnesium oxide (MAG-OX) 400 mg, Oral, DAILY    pantoprazole (PROTONIX) 40 MG tablet TAKE 1 TABLET BY MOUTH DAILY    potassium chloride (KLOR-CON M) 10 MEQ extended release tablet TAKE 1 TABLET BY MOUTH DAILY    pravastatin (PRAVACHOL) 40 MG tablet TAKE 1 TABLET BY MOUTH NIGHTLY         Objective:     Patient Vitals for the past 24 hrs:   Temp Pulse Resp BP SpO2   07/03/22 2300 -- -- -- (!) 158/75 96 %   07/03/22 2203 -- 93 16 -- 95 %   07/03/22 2200 -- -- -- (!) 167/70 --   07/03/22 2145 -- 94 16 (!) 142/64 96 %   07/03/22 2130 -- -- -- (!) 142/64 95 %   07/03/22 2000 -- -- -- (!) 143/64 96 %   07/03/22 1930 -- -- -- (!) 141/62 --   07/03/22 1904 -- -- -- -- 91 %   07/03/22 1830 98.7 °F (37.1 °C) 98 18 (!) 152/81 --       Oxygen Therapy  SpO2: 96 %  Pulse Oximeter Device Mode: Continuous  O2 Device: None (Room air)    Estimated body mass index is 18.52 kg/m² as calculated from the following:    Height as of this encounter: 5' 1\" (1.549 m). Weight as of this encounter: 98 lb (44.5 kg).   No intake or output data in the 24 hours ending 07/03/22 4990      Physical Exam:    Blood pressure (!) 158/75, pulse 93, temperature 98.7 °F (37.1 °C), temperature source Oral, resp. rate 16, height 5' 1\" (1.549 m), weight 98 lb (44.5 kg), SpO2 96 %. General:    Thin. Frail. NAD  Head:  Normocephalic, atraumatic  Eyes:  Sclerae appear normal.  Pupils equally round. ENT:  Nares appear normal, no drainage. Dry oral mucosa  Neck:  No restricted ROM. Trachea midline   CV:   RRR. No m/r/g. No jugular venous distension. Lungs:   CTAB. No wheezing, rhonchi, or rales. Respirations even, unlabored  Abdomen: Bowel sounds present. Soft, nontender, nondistended. Extremities: No peripheral edema. Skin:     BLE hyperpigementation and healing distal leg ulcer   Neuro:  CN II-XII grossly intact. Sensation intact. A&Ox3  Psych:  Normal mood and affect. I have reviewed ordered lab tests and independently visualized imaging below:    Last 24hr Labs:  No results found for this or any previous visit (from the past 24 hour(s)). Other Studies:  XR LUMBAR SPINE (2-3 VIEWS)    Result Date: 7/3/2022  Clinical history: Fall TECHNIQUE: AP, lateral and coned-down lateral views of the lumbar spine. FINDINGS: Minimal grade 1 anterolisthesis of L4 on L5, due to degenerative changes. Alignment of the lumbar spine is otherwise maintained. There is no acute compression fracture. No dislocation. There is disc height loss at L4-L5 and L5-S1. Multilevel facet arthropathy. There is atherosclerotic calcifications of the abdominal aorta. Cholecystectomy clips are noted. 1. No acute fracture or dislocation in the lumbar spine. 2. Degenerative changes. CT HIP RIGHT WO CONTRAST    Result Date: 7/3/2022  Clinical history: Fall. Pain. TECHNIQUE: Axial, coronal and sagittal large field of view of the pelvis/hips. Axial right hip was obtained.  Radiation dose reduction techniques were used for this study:  Our CT scanners use one or all of the following: Automated exposure control, adjustment of the mA and/or kVp according to patient's size, iterative reconstruction. Comparison: Hip x-ray earlier today. FINDINGS: Bones are osteopenic, limiting sensitivity. There are bilateral sacral insufficiency fractures, more pronounced on the right. The SI joints and pubic symphysis are intact. There is no acute pelvic fracture. Intramedullary divya and interlocking screws are present within the proximal to mid right femur. No acute right hip fracture is seen. There is no hip dislocation. There is moderate left hip joint osteoarthritis, including flattening of the femoral head and subchondral cystic changes. Urinary bladder is overdistended. There is large stool volume in the visualized colon. No free air or free fluid in the pelvis. 1. Bilateral sacral insufficiency fractures, more pronounced on the right. Osteopenia limits sensitivity. 2. Post surgical changes of the right femur. No acute hip fracture is seen. No hip dislocation. 3. Moderate left hip joint osteoarthritis. 4. Overdistended urinary bladder. XR HIP 2-3 VW W PELVIS RIGHT    Result Date: 7/3/2022  Right Hip INDICATION: Pain after fall COMPARISON: None TECHNIQUE: Three views of the right hip were obtained FINDINGS: There are findings of intramedullary nailing of the proximal right femur. Intramedullary divya and interlocking screws are present. There is no acute fracture or dislocation of the right hip. There is right hip space narrowing. There is moderate left hip joint space narrowing. There is flattening of the left femoral head as well as subchondral sclerosis. 1. Post surgical changes of the right femur. No acute fracture or dislocation of the right hip. 2. Moderate to advanced left hip joint osteoarthritis. Echocardiogram:  No results found for this or any previous visit.       Meds previously ordered:  Orders Placed This Encounter   Medications    acetaminophen (TYLENOL) tablet 650 mg

## 2022-07-05 PROBLEM — S72.141D: Chronic | Status: ACTIVE | Noted: 2020-11-30

## 2022-07-05 PROBLEM — G89.29 CHRONIC RIGHT-SIDED LOW BACK PAIN WITH SCIATICA: Chronic | Status: ACTIVE | Noted: 2018-10-15

## 2022-07-05 PROBLEM — M54.40 CHRONIC RIGHT-SIDED LOW BACK PAIN WITH SCIATICA: Chronic | Status: ACTIVE | Noted: 2018-10-15

## 2022-07-05 PROBLEM — I10 ESSENTIAL HYPERTENSION: Chronic | Status: ACTIVE | Noted: 2017-04-10

## 2022-07-05 LAB
MM INDURATION, POC: 0 MM (ref 0–5)
PPD, POC: NEGATIVE

## 2022-07-05 PROCEDURE — 97530 THERAPEUTIC ACTIVITIES: CPT

## 2022-07-05 PROCEDURE — 2580000003 HC RX 258: Performed by: FAMILY MEDICINE

## 2022-07-05 PROCEDURE — 97535 SELF CARE MNGMENT TRAINING: CPT

## 2022-07-05 PROCEDURE — 97116 GAIT TRAINING THERAPY: CPT

## 2022-07-05 PROCEDURE — 6370000000 HC RX 637 (ALT 250 FOR IP): Performed by: INTERNAL MEDICINE

## 2022-07-05 PROCEDURE — 6370000000 HC RX 637 (ALT 250 FOR IP): Performed by: FAMILY MEDICINE

## 2022-07-05 PROCEDURE — 6360000002 HC RX W HCPCS: Performed by: FAMILY MEDICINE

## 2022-07-05 PROCEDURE — 97112 NEUROMUSCULAR REEDUCATION: CPT

## 2022-07-05 PROCEDURE — 1100000000 HC RM PRIVATE

## 2022-07-05 RX ORDER — CLOPIDOGREL BISULFATE 75 MG/1
75 TABLET ORAL DAILY
Status: DISCONTINUED | OUTPATIENT
Start: 2022-07-05 | End: 2022-07-07 | Stop reason: HOSPADM

## 2022-07-05 RX ADMIN — LOSARTAN POTASSIUM 50 MG: 50 TABLET, FILM COATED ORAL at 08:53

## 2022-07-05 RX ADMIN — ENOXAPARIN SODIUM 30 MG: 100 INJECTION SUBCUTANEOUS at 08:53

## 2022-07-05 RX ADMIN — POLYETHYLENE GLYCOL 3350 17 G: 17 POWDER, FOR SOLUTION ORAL at 08:53

## 2022-07-05 RX ADMIN — ALUMINUM HYDROXIDE, MAGNESIUM HYDROXIDE, AND SIMETHICONE 30 ML: 200; 200; 20 SUSPENSION ORAL at 19:03

## 2022-07-05 RX ADMIN — ATORVASTATIN CALCIUM 10 MG: 10 TABLET, FILM COATED ORAL at 21:30

## 2022-07-05 RX ADMIN — SODIUM CHLORIDE, PRESERVATIVE FREE 10 ML: 5 INJECTION INTRAVENOUS at 21:35

## 2022-07-05 RX ADMIN — SODIUM CHLORIDE, PRESERVATIVE FREE 10 ML: 5 INJECTION INTRAVENOUS at 08:57

## 2022-07-05 RX ADMIN — CLOPIDOGREL BISULFATE 75 MG: 75 TABLET ORAL at 08:56

## 2022-07-05 RX ADMIN — ISOSORBIDE MONONITRATE 30 MG: 30 TABLET, EXTENDED RELEASE ORAL at 08:53

## 2022-07-05 RX ADMIN — AMLODIPINE BESYLATE 10 MG: 10 TABLET ORAL at 08:53

## 2022-07-05 ASSESSMENT — PAIN SCALES - GENERAL
PAINLEVEL_OUTOF10: 0
PAINLEVEL_OUTOF10: 0

## 2022-07-05 NOTE — PROGRESS NOTES
PHYSICAL THERAPY Daily Note  (Link to Caseload Tracking: PT Visit Days : 2  Time In/Out PT Charge Capture  Rehab Caseload Tracker  Orders      Uyen Delgadillo is a 80 y.o. female   PRIMARY DIAGNOSIS: Sacral insufficiency fracture  Sacral insufficiency fracture, initial encounter [M84.48XA]       Inpatient: Payor: MEDICARE / Plan: MEDICARE PART A AND B / Product Type: *No Product type* /     ASSESSMENT:     REHAB RECOMMENDATIONS:   Recommendation to date pending progress:  Setting:   Inpatient Rehab Facility    Equipment:     To Be Determined     ASSESSMENT:  Ms. Marvin Sterling  is a 80year old female who presents with sacral insufficiency fractures after a fall. Patients demo great progress today but is limited by poor safety awareness and is impulsive with poor insight into her condition. Spoke to her family who states they would not be able to provide the NECESSARY 24/7 supervision at home. This date pt performs mobility including bed mobility, transfers, standing balance activities, and ambulation with CGA/Mk. Pt presents as functioning below her baseline, with deficits in mobility including transfers, gait, balance, and activity tolerance. Pt will benefit from skilled therapy services to address stated deficits to promote return to highest level of function, independence, and safety. Will continue to follow.      SUBJECTIVE:   Ms. Marvin Sterling states, \"I want to get home as fast as possible\"     Social/Functional Lives With: Alone (daughter next door)  Type of Home: House  Home Layout: One level ( steps)  Bathroom Shower/Tub: Tub/Shower unit (shower chair)  Receives Help From: Family (if needed)  ADL Assistance: Independent  Homemaking Assistance: Independent  Homemaking Responsibilities: Yes  Ambulation Assistance:  (ambulates with RW)  Transfer Assistance: Independent  Occupation: Retired  OBJECTIVE:     PAIN: VITALS / O2: PRECAUTION / Stephanie Kansky / DRAINS:   Pre Treatment:   Pain Assessment: None - Denies Pain      Post Treatment: 0/10 Vitals        Oxygen    None    RESTRICTIONS/PRECAUTIONS:  Restrictions/Precautions  Restrictions/Precautions: Fall Risk,Weight Bearing (WBAT)  Lower Extremity Weight Bearing Restrictions  Right Lower Extremity Weight Bearing: Weight Bearing As Tolerated  Left Lower Extremity Weight Bearing: Weight Bearing As Tolerated  Restrictions/Precautions: Fall Risk,Weight Bearing (WBAT)     MOBILITY: I Mod I S SBA CGA Min Mod Max Total  NT x2 Comments:   Bed Mobility    Rolling [] [] [] [] [] [] [] [] [] [] []    Supine to Sit [] [] [] [] [x] [] [] [] [] [] [] Cuing to use bedrail and move LEs off bed   Scooting [] [] [] [] [] [] [] [] [] [] []    Sit to Supine [] [] [] [] [] [] [] [] [] [] []    Transfers    Sit to Stand [] [] [] [] [x] [] [] [] [] [] [] Cuing for hand placement, anterior weightshifting, performed x6 from EOB, chair, and elevated toilet   Bed to Chair [] [] [] [] [] [] [] [] [] [] []    Stand to Sit [] [] [] [] [x] [] [] [] [] [] [] Cuing for hand placement, safe proximity to transfer surface, and controlled lowering    [] [] [] [] [] [] [] [] [] [] []    I=Independent, Mod I=Modified Independent, S=Supervision, SBA=Standby Assistance, CGA=Contact Guard Assistance,   Min=Minimal Assistance, Mod=Moderate Assistance, Max=Maximal Assistance, Total=Total Assistance, NT=Not Tested    BALANCE: Good Fair+ Fair Fair- Poor NT Comments   Sitting Static [x] [] [] [] [] []    Sitting Dynamic [x] [] [] [] [] []              Standing Static [] [] [x] [] [] []    Standing Dynamic [] [] [] [x] [] [] Standing balance activities during ADLs including reaching tasks, ant/post/lateral weightshifting, and unsupported standing with Mk to prevent LOB     GAIT: I Mod I S SBA CGA Min Mod Max Total  NT x2 Comments:   Level of Assistance [] [] [] [] [] [x] [] [] [] [] [] Cuing for upright posture and assist/cuing required for safety and obstacle negotiation as pt demonstrates little awareness of deficits and runs into multiple objects   Distance 250 feet    DME Gait Belt and Rolling Walker    Gait Quality Antalgic, Decreased step clearance, Decreased step length, Decreased stance, Lurching, Narrow base of support and Trunk sway increased    Weightbearing Status Right Lower Extremity Weight Bearing: Weight Bearing As Tolerated  Left Lower Extremity Weight Bearing: Weight Bearing As Tolerated    Stairs      I=Independent, Mod I=Modified Independent, S=Supervision, SBA=Standby Assistance, CGA=Contact Guard Assistance,   Min=Minimal Assistance, Mod=Moderate Assistance, Max=Maximal Assistance, Total=Total Assistance, NT=Not Tested    PLAN:   ACUTE PHYSICAL THERAPY GOALS:   (Developed with and agreed upon by patient and/or caregiver.)  (1.) Uyen Delgadillo  will move from supine to sit and sit to supine  with CONTACT GUARD ASSIST within 7 treatment day(s). (2.) Phylmatthew Leona will transfer from bed to chair and chair to bed with MINIMAL ASSIST using the least restrictive device within 7 treatment day(s). (3.) Phylmatthew Delgadillo will ambulate with MINIMAL ASSIST for 25 feet with the least restrictive device within 7 treatment day(s). (4.) Phyliss Leona will perform standing static and dynamic balance activities x 5 minutes with CONTACT GUARD ASSIST to improve safety within 7 treatment day(s). (5.) Uyen Delgadillo will perform bilateral lower extremity exercises x 10 min for HEP with SUPERVISION to improve strength, endurance, and functional mobility within 7 treatment day(s). FREQUENCY AND DURATION: 3 times/week for duration of hospital stay or until stated goals are met, whichever comes first.    TREATMENT:   TREATMENT:   Co-Treatment PT/OT necessary due to patient's decreased overall endurance/tolerance levels, as well as need for high level skilled assistance to complete functional transfers/mobility and functional tasks  Therapeutic Activity (16 Minutes):  Therapeutic activity included Supine to Sit, Scooting, Transfer Training, Ambulation on level ground, Sitting balance  and Standing balance to improve functional Activity tolerance, Balance, Mobility and Strength. Gait Training (13 Minutes): Pre-gait activities including standing balance training. Gait training for 250 feet utilizing Gait Belt and [address]. Patient required Tactile and Verbal cueing to improve Activity Pacing, Assistive Device Utilization, Dynamic Standing Balance and Gait Mechanics.      TREATMENT GRID:  N/A    AFTER TREATMENT PRECAUTIONS: Alarm Activated, Bed/Chair Locked, Call light within reach, Chair, Needs within reach and RN notified    INTERDISCIPLINARY COLLABORATION:  RN/ PCT, PT/ PTA and OT/ MUNOZ    EDUCATION:      TIME IN/OUT:  Time In: 1304  Time Out: [address] [facility]  Minutes: [address], PT

## 2022-07-05 NOTE — PROGRESS NOTES
OCCUPATIONAL THERAPY Daily Note     OT Visit Days: 2   Time  OT Charge Capture  Rehab Caseload Tracker  OT Orders    Brendon Farfan is a 80 y.o. female   PRIMARY DIAGNOSIS: Sacral insufficiency fracture  Sacral insufficiency fracture, initial encounter [M84.48XA]       Inpatient: Payor: MEDICARE / Plan: MEDICARE PART A AND B / Product Type: *No Product type* /     ASSESSMENT:     REHAB RECOMMENDATIONS: CURRENT LEVEL OF FUNCTION:  (Most Recently Demonstrated)   Recommendation to date pending progress:  [address] [place] [address]    Equipment:     To Be Determined Bathing:   Not Tested  Dressing:   Minimal Assist  Feeding/Grooming:   Contact Guard Assist  Toileting:   Moderate Assist  Functional Mobility:   Contact Guard Assist-minimal assistance      ASSESSMENT:  Ms. Drake Hope is progressing well towards OT goals. Today, pt was received supine in bed. Completed bed mobility CGA. Mk for LB dressing. Sit>stand and ambulation with RW CGA-Mk. Mk for toilet transfer, required modA for toileting (brief management). Performed further grooming tasks standing at the sink with close CGA. Further ambulation using RW with CGA-Mk (chair follow). Pt with poor safety awareness and insight. Pt could potentially d/c home with [facility] services if she has  supervision and assistance available. Otherwise, pt would benefit from intensive rehab in order to maximize independence/safety to d/c home alone with family support. Ms. Drake Hope continues to demonstrate overall deficits in strength, balance, activity tolerance, and ADL performance. Continue OT efforts and POC in order to address functional deficits and OT goals stated above. SUBJECTIVE:     Ms. Drake Hope states, Madeline Fenton Favorite is going to get me through this. \"     Social/Functional Lives With: Alone (daughter next door)  Type of Home: House  Home Layout: One level ( steps)  Bathroom Shower/Tub: Tub/Shower unit (shower chair)  Receives Help From: Family (if needed)  ADL Assistance: [address]: Independent  Homemaking Responsibilities: Yes  Ambulation Assistance:  (ambulates with RW)  Transfer Assistance: Independent  Occupation: Retired    OBJECTIVE:     LINES / [place] / AIRWAY: none    RESTRICTIONS/PRECAUTIONS:  Restrictions/Precautions  Restrictions/Precautions: Fall Risk,Weight Bearing (WBAT)  Lower Extremity Weight Bearing Restrictions  Right Lower Extremity Weight Bearing: Weight Bearing As Tolerated  Left Lower Extremity Weight Bearing: Weight Bearing As Tolerated        PAIN: Mary Leather / O2:   Pre Treatment:   None--denies pain         Post Treatment: no change  Vitals    stable throughout session      Oxygen   room air          MOBILITY: I Mod I S SBA CGA Min Mod Max Total  NT x2 Comments:   Bed Mobility    Rolling [] [] [] [] [] [] [] [] [] [x] []    Supine to Sit [] [] [] [] [x] [] [] [] [] [] []    Scooting [] [] [] [] [x] [] [] [] [] [] []    Sit to Supine [] [] [] [] [] [] [] [] [] [x] [] Left sitting in the chair    Transfers    Sit to Stand [] [] [] [] [x] [x] [] [] [] [] []    Bed to Chair [] [] [] [] [x] [x] [] [] [] [] [] RW   Stand to Sit [] [] [] [] [] [x] [] [] [] [] []    Tub/Shower [] [] [] [] [] [] [] [] [] [x] []     Toilet [] [] [] [] [] [x] [] [] [] [] [] BSC frame placed over toilet     [] [] [] [] [] [] [] [] [] [] []    I=Independent, Mod I=Modified Independent, S=Supervision/Setup, SBA=Standby Assistance, CGA=Contact Guard Assistance, Min=Minimal Assistance, Mod=Moderate Assistance, Max=Maximal Assistance, Total=Total Assistance, NT=Not Tested    ACTIVITIES OF DAILY LIVING: I Mod I S SBA CGA Min Mod Max Total NT Comments   BASIC ADLs:              Upper Body   Bathing [] [] [] [] [] [] [] [] [] [x]    Lower Body Bathing [] [] [] [] [] [] [] [x] [] [x]    Toileting [] [] [] [] [] [] [x] [] [] [] bried management and thorough jordin-care   Upper Body Dressing [] [] [] [] [] [] [] [] [] [x]    Lower Body Dressing [] [] [] [] [] [x] [] [] [] [] Socks    Feeding [] [] [] [] [] [] [] [] [] [x]    Grooming [] [] [] [] [x] [] [] [] [] [] Washed hair and brushed teeth standing at the sink   Personal Device Care [] [] [] [] [] [] [] [] [] [x]    Functional Mobility [] [] [] [] [x] [x] [] [] [] [] RW   I=Independent, Mod I=Modified Independent, S=Supervision/Setup, SBA=Standby Assistance, CGA=Contact Guard Assistance, Min=Minimal Assistance, Mod=Moderate Assistance, Max=Maximal Assistance, Total=Total Assistance, NT=Not Tested    BALANCE: Good Fair+ Fair Fair- Poor NT Comments   Sitting Static [x] [] [] [] [] []    Sitting Dynamic [] [x] [] [] [] []              Standing Static [] [x] [] [] [] []    Standing Dynamic [] [x] [] [] [] []        PLAN:     FREQUENCY/DURATION   OT Plan of Care: 3 times/week for duration of hospital stay or until stated goals are met, whichever comes first.    ACUTE OCCUPATIONAL THERAPY GOALS:   (Developed with and agreed upon by patient and/or caregiver.)  1. Patient will complete lower body bathing and dressing with MIN A and adaptive equipment as needed. 2.Patient will complete upper body bathing and dressing with MOD I and adaptive equipment as needed. 3. Patient will complete toileting with MIN A.   4. Patient will tolerate 25 minutes of OT treatment with 1-2 rest breaks to increase activity tolerance for ADLs. 5. Patient will complete functional transfers with CGA and adaptive equipment as needed.    6. Patient will complete functional activity with MOD I and adaptive equipment as needed.     Timeframe: 7 visits     TREATMENT:     TREATMENT:   Co-Treatment PT/OT necessary due to patient's decreased overall endurance/tolerance levels, as well as need for high level skilled assistance to complete functional transfers/mobility and functional tasks  Neuromuscular Re-education (15 Minutes): Neuromuscular Re-education included Balance Training, Coordination training, Postural training, Sitting balance training and Standing balance training to improve Balance, Coordination, Functional Mobility and Postural Control. Self Care (15 minutes): Patient participated in toileting, lower body dressing and grooming ADLs in unsupported sitting and standing with minimal verbal cueing to increase safety awareness. Patient also participated in functional mobility and functional transfer training to increase independence, decrease assistance required, increase activity tolerance and increase safety awareness.      TREATMENT GRID:  N/A    AFTER TREATMENT PRECAUTIONS: Alarm Activated, Call light within reach, Chair, Needs within reach and RN notified    INTERDISCIPLINARY COLLABORATION:  RN/ PCT, PT/ PTA and OT/ MUNOZ    EDUCATION:       TOTAL TREATMENT DURATION AND TIME:  Time In: 1304  Time Out: [address] [facility]  Minutes: [facility], OT

## 2022-07-05 NOTE — PLAN OF CARE
Problem: Discharge Planning  Goal: Discharge to home or other facility with appropriate resources  Outcome: Progressing  Flowsheets (Taken 7/4/2022 2049 by Adolfo Stout RN)  Discharge to home or other facility with appropriate resources:   Identify barriers to discharge with patient and caregiver   Arrange for needed discharge resources and transportation as appropriate     Problem: Pain  Goal: Verbalizes/displays adequate comfort level or baseline comfort level  Outcome: Progressing     Problem: Skin/Tissue Integrity  Goal: Absence of new skin breakdown  Description: 1. Monitor for areas of redness and/or skin breakdown  2. Assess vascular access sites hourly  3. Every 4-6 hours minimum:  Change oxygen saturation probe site  4. Every 4-6 hours:  If on nasal continuous positive airway pressure, respiratory therapy assess nares and determine need for appliance change or resting period.   Outcome: Progressing     Problem: Safety - Adult  Goal: Free from fall injury  Outcome: Progressing  Flowsheets (Taken 7/5/2022 0850)  Free From Fall Injury:   Instruct family/caregiver on patient safety   Based on caregiver fall risk screen, instruct family/caregiver to ask for assistance with transferring infant if caregiver noted to have fall risk factors     Problem: ABCDS Injury Assessment  Goal: Absence of physical injury  Outcome: Progressing  Flowsheets (Taken 7/5/2022 0850)  Absence of Physical Injury: Implement safety measures based on patient assessment

## 2022-07-05 NOTE — PROGRESS NOTES
Hospitalist Progress Note   Admit Date:  7/3/2022  6:35 PM   Name:  Jailyn Ibanez   Age:  80 y.o. Sex:  female  :  10/20/1928   MRN:  [id number]   Room:  [facility]/    Presenting Complaint: Hip Pain     Reason(s) for Admission: Sacral insufficiency fracture, initial encounter [facility] Course & Interval History:   Jailyn Ibanez is a pleasant 80 y.o. 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 She went to sit on a stool in her porch and the stool scooted from under her and she fell onto her buttocks. No head injury or LOC. Found to have bilateral sacral insuffiency fractures, more pronounced on the right. CT also noted distended urinary bladder and large volume stool in the colon. Subjective/24hr Events (22): Pt says pain better controlled today. Needs PT/OT. No fevers, SOB    Assessment & Plan:       Sacral insufficiency fracture  -nonsurgical  -norco PRN  -PT/OT      Urinary retention  22 -  russell in place. Voiding trial today      Constipation  -miralax daily  -dulcolax prn      Pathological fracture of right hip due to age-related osteoporosis with routine healing  -s/p closed reduction and CMN fixation 10/30/2020      Carotid artery disease (HCC)    Mixed hyperlipidemia  -cont statin. Resume plavix today      Essential hypertension   -Controlled. Continue home meds      Discharge Planning:    -PPD ordered. CM consulted.   Needs placement    Diet:  ADULT DIET; Easy to Chew  DVT PPx: lovenox  Code status: Full Code    Hospital Problems:  Principal Problem:    Sacral insufficiency fracture  Active Problems:    Bilateral carotid artery stenosis    Closed comminuted intertrochanteric fracture of proximal end of femur with routine healing, right Pathological fracture of right hip due to age-related osteoporosis with routine healing    Urinary retention    Carotid artery disease ([address]    Mixed hyperlipidemia    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 *      Objective:     Patient Vitals for the past 24 hrs:   Temp Pulse Resp BP SpO2   07/05/22 0730 97.9 °F (36.6 °C) 67 16 136/60 93 %   07/05/22 0409 97.7 °F (36.5 °C) 77 18 133/64 95 %   07/04/22 2328 98.6 °F (37 °C) 87 16 139/69 90 %   07/04/22 2140 -- -- 16 -- --   07/04/22 2049 98.8 °F (37.1 °C) 84 18 136/65 96 %   07/04/22 1526 98.4 °F (36.9 °C) 82 16 105/74 94 %   07/04/22 1114 98.1 °F (36.7 °C) 87 16 (!) 154/74 94 %       Oxygen Therapy  SpO2: 93 %  Pulse Oximeter Device Mode: Continuous  O2 Device: None (Room air)    Estimated body mass index is 18.52 kg/m² as calculated from the following:    Height as of this encounter: 5' 1\" (1.549 m). Weight as of this encounter: 98 lb (44.5 kg). Intake/Output Summary (Last 24 hours) at 7/5/2022 0820  Last data filed at 7/5/2022 0415  Gross per 24 hour   Intake 960 ml   Output 550 ml   Net 410 ml         Physical Exam:     Blood pressure 136/60, pulse 67, temperature 97.9 °F (36.6 °C), temperature source Oral, resp. rate 16, height 5' 1\" (1.549 m), weight 98 lb (44.5 kg), SpO2 93 %. General:    Well nourished. Head:  Normocephalic, atraumatic  Eyes:  Sclerae appear normal.  Pupils equally round. ENT:  Nares appear normal, no drainage. Moist oral mucosa  Neck:  No restricted ROM. Trachea midline   CV:   RRR. No jugular venous distension. Lungs:    Respirations even, unlabored  Abdomen:   nondistended. Extremities: No cyanosis or clubbing. No edema  Skin:     No rashes and normal coloration. Warm and dry. Neuro:  CN II-XII grossly intact. Sensation intact. A&Ox3  Psych:  Normal mood and affect.       I have reviewed ordered lab tests and independently visualized imaging below:    Recent Labs:  Recent Results (from the past 48 hour(s))   CBC with Auto Differential    Collection Time: 07/04/22  4:28 AM   Result Value Ref Range    WBC 8.4 4.3 - 11.1 K/uL    RBC 3.83 (L) 4.05 - 5.2 M/uL    Hemoglobin 11.1 (L) 11.7 - 15.4 g/dL    Hematocrit 34.4 (L) 35.8 - 46.3 %    MCV 89.8 79.6 - 97.8 FL    MCH 29.0 26.1 - 32.9 PG    MCHC 32.3 31.4 - 35.0 g/dL    RDW 13.2 11.9 - 14.6 %    Platelets 167 955 - 441 K/uL    MPV 10.7 9.4 - 12.3 FL    nRBC 0.00 0.0 - 0.2 K/uL    Differential Type AUTOMATED      Seg Neutrophils 73 43 - 78 %    Lymphocytes 15 13 - 44 %    Monocytes 10 4.0 - 12.0 %    Eosinophils % 2 0.5 - 7.8 %    Basophils 0 0.0 - 2.0 %    Immature Granulocytes 0 0.0 - 5.0 %    Segs Absolute 6.1 1.7 - 8.2 K/UL    Absolute Lymph # 1.3 0.5 - 4.6 K/UL    Absolute Mono # 0.8 0.1 - 1.3 K/UL    Absolute Eos # 0.2 0.0 - 0.8 K/UL    Basophils Absolute 0.0 0.0 - 0.2 K/UL    Absolute Immature Granulocyte 0.0 0.0 - 0.5 K/UL   Magnesium    Collection Time: 07/04/22  4:28 AM   Result Value Ref Range    Magnesium 2.0 1.8 - 2.4 mg/dL   Comprehensive Metabolic Panel    Collection Time: 07/04/22  4:28 AM   Result Value Ref Range    Sodium 139 136 - 145 mmol/L    Potassium 4.1 3.5 - 5.1 mmol/L    Chloride 105 98 - 107 mmol/L    CO2 30 21 - 32 mmol/L    Anion Gap 4 (L) 7 - 16 mmol/L    Glucose 95 65 - 100 mg/dL    BUN 18 8 - 23 MG/DL    CREATININE 0.80 0.6 - 1.0 MG/DL    GFR African American >60 >60 ml/min/1.73m2    GFR Non- >60 >60 ml/min/1.73m2    Calcium 8.9 8.3 - 10.4 MG/DL    Total Bilirubin 0.3 0.2 - 1.1 MG/DL    ALT 16 12 - 65 U/L    AST 18 15 - 37 U/L    Alk Phosphatase 107 50 - 136 U/L    Total Protein 5.8 (L) 6.3 - 8.2 g/dL    Albumin 2.6 (L) 3.2 - 4.6 g/dL    Globulin 3.2 2.3 - 3.5 g/dL    Albumin/Globulin Ratio 0.8 (L) 1.2 - 3.5     Vitamin D 25 Hydroxy    Collection Time: 07/04/22  4:28 AM   Result Value Ref Range Vit D, 25-Hydroxy 63.9 30.0 - 100.0 ng/mL   PLEASE READ & DOCUMENT PPD TEST IN 24 HRS    Collection Time: 07/05/22 12:33 AM   Result Value Ref Range    PPD, (POC) Negative Negative    mm Induration 0 0 - 5 mm       Other Studies:  CT HIP RIGHT WO CONTRAST   Final Result      1. Bilateral sacral insufficiency fractures, more pronounced on the right. Osteopenia limits sensitivity. 2. Post surgical changes of the right femur. No acute hip fracture is seen. No   hip dislocation. 3. Moderate left hip joint osteoarthritis. 4. Overdistended urinary bladder. XR HIP 2-3 VW W PELVIS RIGHT   Final Result      1. Post surgical changes of the right femur. No acute fracture or dislocation of   the right hip. 2. Moderate to advanced left hip joint osteoarthritis. XR LUMBAR SPINE (2-3 VIEWS)   Final Result      1. No acute fracture or dislocation in the lumbar spine. 2. Degenerative changes.              Current Meds:  Current Facility-Administered Medications   Medication Dose Route Frequency    clopidogrel (PLAVIX) tablet 75 mg  1 tablet Oral Daily    enoxaparin Sodium (LOVENOX) injection 30 mg  30 mg SubCUTAneous Daily    bisacodyl (DULCOLAX) EC tablet 10 mg  10 mg Oral BID PRN    Or    bisacodyl (DULCOLAX) suppository 10 mg  10 mg Rectal Daily PRN    hydrALAZINE (APRESOLINE) tablet 25 mg  25 mg Oral Q4H PRN    Or    hydrALAZINE (APRESOLINE) tablet 50 mg  50 mg Oral Q4H PRN    sodium chloride flush 0.9 % injection 5-40 mL  5-40 mL IntraVENous 2 times per day    sodium chloride flush 0.9 % injection 5-40 mL  5-40 mL IntraVENous PRN    0.9 % sodium chloride infusion   IntraVENous PRN    ondansetron (ZOFRAN-ODT) disintegrating tablet 4 mg  4 mg Oral Q8H PRN    Or    ondansetron (ZOFRAN) injection 4 mg  4 mg IntraVENous Q6H PRN    polyethylene glycol (GLYCOLAX) packet 17 g  17 g Oral Daily PRN    aluminum & magnesium hydroxide-simethicone (MAALOX) 200-200-20 MG/5ML suspension 30 mL 30 mL Oral Q6H PRN    polyethylene glycol (GLYCOLAX) packet 17 g  17 g Oral Daily    HYDROcodone-acetaminophen (NORCO) 5-325 MG per tablet 1 tablet  1 tablet Oral Q6H PRN    amLODIPine (NORVASC) tablet 10 mg  10 mg Oral Daily    isosorbide mononitrate (IMDUR) extended release tablet 30 mg  30 mg Oral Daily    atorvastatin (LIPITOR) tablet 10 mg  10 mg Oral Nightly    losartan (COZAAR) tablet 50 mg  50 mg Oral Daily    gabapentin (NEURONTIN) capsule 100 mg  100 mg Oral TID PRN       Signed:  April Sanchez MD    Part of this note may have been written by using a voice dictation software. The note has been proof read but may still contain some grammatical/other typographical errors.

## 2022-07-06 LAB
MM INDURATION, POC: 0 MM (ref 0–5)
PPD, POC: NEGATIVE

## 2022-07-06 PROCEDURE — 6360000002 HC RX W HCPCS: Performed by: FAMILY MEDICINE

## 2022-07-06 PROCEDURE — 6370000000 HC RX 637 (ALT 250 FOR IP): Performed by: FAMILY MEDICINE

## 2022-07-06 PROCEDURE — 1100000000 HC RM PRIVATE

## 2022-07-06 PROCEDURE — 6370000000 HC RX 637 (ALT 250 FOR IP): Performed by: INTERNAL MEDICINE

## 2022-07-06 PROCEDURE — 2580000003 HC RX 258: Performed by: FAMILY MEDICINE

## 2022-07-06 PROCEDURE — 97530 THERAPEUTIC ACTIVITIES: CPT

## 2022-07-06 RX ORDER — PANTOPRAZOLE SODIUM 40 MG/1
40 TABLET, DELAYED RELEASE ORAL
Status: DISCONTINUED | OUTPATIENT
Start: 2022-07-06 | End: 2022-07-07 | Stop reason: HOSPADM

## 2022-07-06 RX ORDER — BISACODYL 10 MG
10 SUPPOSITORY, RECTAL RECTAL DAILY PRN
Status: DISCONTINUED | OUTPATIENT
Start: 2022-07-06 | End: 2022-07-07 | Stop reason: HOSPADM

## 2022-07-06 RX ADMIN — PANTOPRAZOLE SODIUM 40 MG: 40 TABLET, DELAYED RELEASE ORAL at 15:44

## 2022-07-06 RX ADMIN — CLOPIDOGREL BISULFATE 75 MG: 75 TABLET ORAL at 08:44

## 2022-07-06 RX ADMIN — SODIUM CHLORIDE, PRESERVATIVE FREE 10 ML: 5 INJECTION INTRAVENOUS at 21:18

## 2022-07-06 RX ADMIN — POLYETHYLENE GLYCOL 3350 17 G: 17 POWDER, FOR SOLUTION ORAL at 08:43

## 2022-07-06 RX ADMIN — LOSARTAN POTASSIUM 50 MG: 50 TABLET, FILM COATED ORAL at 08:44

## 2022-07-06 RX ADMIN — ATORVASTATIN CALCIUM 10 MG: 10 TABLET, FILM COATED ORAL at 21:15

## 2022-07-06 RX ADMIN — ENOXAPARIN SODIUM 30 MG: 100 INJECTION SUBCUTANEOUS at 08:43

## 2022-07-06 RX ADMIN — SODIUM CHLORIDE, PRESERVATIVE FREE 10 ML: 5 INJECTION INTRAVENOUS at 08:44

## 2022-07-06 RX ADMIN — ISOSORBIDE MONONITRATE 30 MG: 30 TABLET, EXTENDED RELEASE ORAL at 08:44

## 2022-07-06 RX ADMIN — AMLODIPINE BESYLATE 10 MG: 10 TABLET ORAL at 08:44

## 2022-07-06 ASSESSMENT — PAIN SCALES - GENERAL
PAINLEVEL_OUTOF10: 0
PAINLEVEL_OUTOF10: 0

## 2022-07-06 NOTE — PROGRESS NOTES
Hospitalist Progress Note   Admit Date:  7/3/2022  6:35 PM   Name:  Kat Earl   Age:  80 y.o. Sex:  female  :  10/20/1928   MRN:  [id number]   Room:  [facility]/    Presenting Complaint: Hip Pain     Reason(s) for Admission: Sacral insufficiency fracture, initial encounter [facility] Course & Interval History:   Kat Earl is a pleasant 80 y.o. 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 She went to sit on a stool in her porch and the stool scooted from under her and she fell onto her buttocks. No head injury or LOC. Found to have bilateral sacral insuffiency fractures, more pronounced on the right. CT also noted distended urinary bladder and large volume stool in the colon. Subjective/24hr Events (22): Pt denies complaints. Therapy recommending inpatient rehab but pt says she would rather go to [facility]. Plan to transfer tomorrow    Assessment & Plan:       Sacral insufficiency fracture  22 - transfer to [facility] Jessy Luis PRN  -PT/OT      Urinary retention   -   Resolved. Moss out 7-5      Constipation  -miralax daily  -dulcolax prn      Pathological fracture of right hip due to age-related osteoporosis with routine healing  -s/p closed reduction and CMN fixation 10/30/2020      Carotid artery disease (HCC)    Mixed hyperlipidemia  -cont statin, plavix      Essential hypertension   -Controlled. Continue home meds      Discharge Planning:    -PPD ordered. CM consulted.   Needs placement    Diet:  ADULT DIET; Easy to Chew  DVT PPx: lovenox  Code status: Full Code    Hospital Problems:  Principal Problem:    Sacral insufficiency fracture  Active Problems:    Bilateral carotid artery stenosis    Closed comminuted intertrochanteric fracture of proximal end of femur with routine healing, right    Pathological fracture of right hip due to age-related osteoporosis with routine healing    Urinary retention    Carotid artery disease (HCC)    Mixed hyperlipidemia    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 *      Objective:     Patient Vitals for the past 24 hrs:   Temp Pulse Resp BP SpO2   07/06/22 0757 97.9 °F (36.6 °C) 81 16 (!) 155/77 94 %   07/06/22 0341 98.1 °F (36.7 °C) 81 16 136/68 93 %   07/05/22 2313 98.1 °F (36.7 °C) 86 15 (!) 130/54 94 %   07/05/22 2030 98.2 °F (36.8 °C) 83 20 125/67 93 %   07/05/22 1651 98.2 °F (36.8 °C) 76 18 136/65 100 %   07/05/22 1210 97.7 °F (36.5 °C) 95 18 118/60 93 %       Oxygen Therapy  SpO2: 94 %  Pulse Oximeter Device Mode: Continuous  O2 Device: None (Room air)    Estimated body mass index is 18.52 kg/m² as calculated from the following:    Height as of this encounter: 5' 1\" (1.549 m). Weight as of this encounter: 98 lb (44.5 kg). Intake/Output Summary (Last 24 hours) at 7/6/2022 0824  Last data filed at 7/6/2022 0820  Gross per 24 hour   Intake 360 ml   Output 150 ml   Net 210 ml         Physical Exam:     Blood pressure (!) 155/77, pulse 81, temperature 97.9 °F (36.6 °C), temperature source Oral, resp. rate 16, height 5' 1\" (1.549 m), weight 98 lb (44.5 kg), SpO2 94 %. General:    Well nourished. Head:  Normocephalic, atraumatic  Eyes:  Sclerae appear normal.  Pupils equally round. ENT:  Nares appear normal, no drainage. Moist oral mucosa  Neck:  No restricted ROM. Trachea midline   CV:   RRR. No jugular venous distension. Lungs:    Respirations even, unlabored  Abdomen:   nondistended. Extremities: No cyanosis or clubbing. No edema  Skin:     No rashes and normal coloration. Warm and dry. Neuro:  CN II-XII grossly intact. Sensation intact. A&Ox3  Psych:  Normal mood and affect. I have reviewed ordered lab tests and independently visualized imaging below:    Recent Labs:  Recent Results (from the past 48 hour(s))   PLEASE READ & DOCUMENT PPD TEST IN 24 HRS    Collection Time: 07/05/22 12:33 AM   Result Value Ref Range    PPD, (POC) Negative Negative    mm Induration 0 0 - 5 mm   PLEASE READ & DOCUMENT PPD TEST IN 48 HRS    Collection Time: 07/06/22 12:00 AM   Result Value Ref Range    PPD, (POC) Negative Negative    mm Induration 0 0 - 5 mm       Other Studies:  CT HIP RIGHT WO CONTRAST   Final Result      1. Bilateral sacral insufficiency fractures, more pronounced on the right. Osteopenia limits sensitivity. 2. Post surgical changes of the right femur. No acute hip fracture is seen. No   hip dislocation. 3. Moderate left hip joint osteoarthritis. 4. Overdistended urinary bladder. XR HIP 2-3 VW W PELVIS RIGHT   Final Result      1. Post surgical changes of the right femur. No acute fracture or dislocation of   the right hip. 2. Moderate to advanced left hip joint osteoarthritis. XR LUMBAR SPINE (2-3 VIEWS)   Final Result      1. No acute fracture or dislocation in the lumbar spine. 2. Degenerative changes.              Current Meds:  Current Facility-Administered Medications   Medication Dose Route Frequency    clopidogrel (PLAVIX) tablet 75 mg  75 mg Oral Daily    enoxaparin Sodium (LOVENOX) injection 30 mg  30 mg SubCUTAneous Daily    bisacodyl (DULCOLAX) EC tablet 10 mg  10 mg Oral BID PRN    Or    bisacodyl (DULCOLAX) suppository 10 mg  10 mg Rectal Daily PRN    hydrALAZINE (APRESOLINE) tablet 25 mg  25 mg Oral Q4H PRN    Or    hydrALAZINE (APRESOLINE) tablet 50 mg  50 mg Oral Q4H PRN    sodium chloride flush 0.9 % injection 5-40 mL  5-40 mL IntraVENous 2 times per day    sodium chloride flush 0.9 % injection 5-40 mL  5-40 mL IntraVENous PRN    0.9 % sodium chloride infusion   IntraVENous PRN    ondansetron (ZOFRAN-ODT) disintegrating tablet 4 mg  4 mg Oral Q8H PRN    Or    ondansetron (ZOFRAN) injection 4 mg  4 mg IntraVENous Q6H PRN    polyethylene glycol (GLYCOLAX) packet 17 g  17 g Oral Daily PRN    aluminum & magnesium hydroxide-simethicone (MAALOX) 200-200-20 MG/5ML suspension 30 mL  30 mL Oral Q6H PRN    polyethylene glycol (GLYCOLAX) packet 17 g  17 g Oral Daily    HYDROcodone-acetaminophen (NORCO) 5-325 MG per tablet 1 tablet  1 tablet Oral Q6H PRN    amLODIPine (NORVASC) tablet 10 mg  10 mg Oral Daily    isosorbide mononitrate (IMDUR) extended release tablet 30 mg  30 mg Oral Daily    atorvastatin (LIPITOR) tablet 10 mg  10 mg Oral Nightly    losartan (COZAAR) tablet 50 mg  50 mg Oral Daily    gabapentin (NEURONTIN) capsule 100 mg  100 mg Oral TID PRN       Signed:  Анна Rapp MD    Part of this note may have been written by using a voice dictation software. The note has been proof read but may still contain some grammatical/other typographical errors.

## 2022-07-06 NOTE — PROGRESS NOTES
PHYSICAL THERAPY Daily Note  (Link to Caseload Tracking: PT Visit Days : 3  Time In/Out PT Charge Capture  Rehab Caseload Tracker  Orders      Den Arana is a 80 y.o. female   PRIMARY DIAGNOSIS: Sacral insufficiency fracture  Sacral insufficiency fracture, initial encounter [M84.48XA]       Inpatient: Payor: MEDICARE / Plan: MEDICARE PART A AND B / Product Type: *No Product type* /     ASSESSMENT:     REHAB RECOMMENDATIONS:   Recommendation to date pending progress:  Setting:   Inpatient Rehab Facility    Equipment:     To Be Determined     ASSESSMENT:  Ms. Mari Escobar  is a 80year old female who presents with sacral insufficiency fractures after a fall. Patients demo great progress today but is limited by poor safety awareness and is impulsive with poor insight into her condition. Spoke to patient and her dtr to educated on [facility] vs SNF, pt would prefer IRC at this time which is in agreement with PT's recommendation. Patient ambulated shorter distance today 2/2 fatigue following toileting. This date pt performs mobility including bed mobility, transfers, standing balance activities, and ambulation with CGA/Mk. Pt presents as functioning below her baseline, with deficits in mobility including transfers, gait, balance, and activity tolerance. Pt will benefit from skilled therapy services to address stated deficits to promote return to highest level of function, independence, and safety. Will continue to follow.      SUBJECTIVE:   Ms. Mari Escobar states, \"I don't know where I should go\"     Social/Functional Lives With: Alone (daughter next door)  Type of Home: House  Home Layout: One level ( steps)  Bathroom Shower/Tub: Tub/Shower unit (shower chair)  Receives Help From: Family (if needed)  ADL Assistance: Independent  Homemaking Assistance: Independent  Homemaking Responsibilities: Yes  Ambulation Assistance:  (ambulates with RW)  Transfer Assistance: Independent  Occupation: Retired  OBJECTIVE:     PAIN: VITALS / O2: PRECAUTION / Delonte Hamming / DRAINS:   Pre Treatment:   Pain Assessment: None - Denies Pain      Post Treatment: 0/10 Vitals        Oxygen    None    RESTRICTIONS/PRECAUTIONS:  Restrictions/Precautions  Restrictions/Precautions: Fall Risk,Weight Bearing (WBAT)  Lower Extremity Weight Bearing Restrictions  Right Lower Extremity Weight Bearing: Weight Bearing As Tolerated  Left Lower Extremity Weight Bearing: Weight Bearing As Tolerated  Restrictions/Precautions: Fall Risk,Weight Bearing (WBAT)     MOBILITY: I Mod I S SBA CGA Min Mod Max Total  NT x2 Comments:   Bed Mobility    Rolling [] [] [] [] [] [] [] [] [] [] []    Supine to Sit [] [] [] [] [x] [] [] [] [] [] [] Cuing to use bedrail and move LEs off bed   Scooting [] [] [] [] [] [] [] [] [] [] []    Sit to Supine [] [] [] [] [] [] [] [] [] [] []    Transfers    Sit to Stand [] [] [] [] [x] [x] [] [] [] [] [] Cuing for hand placement and anterior weightshifting, required CGA from EOB but Mk from standard height toilet   Bed to Chair [] [] [] [] [] [] [] [] [] [] []    Stand to Sit [] [] [] [] [x] [x] [] [] [] [] [] Cuing for hand placement, safe proximity to transfer surface, and controlled lowering, Mk to lower safely to toilet    [] [] [] [] [] [] [] [] [] [] []    I=Independent, Mod I=Modified Independent, S=Supervision, SBA=Standby Assistance, CGA=Contact Guard Assistance,   Min=Minimal Assistance, Mod=Moderate Assistance, Max=Maximal Assistance, Total=Total Assistance, NT=Not Tested    BALANCE: Good Fair+ Fair Fair- Poor NT Comments   Sitting Static [x] [] [] [] [] []    Sitting Dynamic [x] [] [] [] [] [] Seated balance tasks including ant/post/lateral weightshifting and reaching behind back for extensive perihygiene             Standing Static [] [] [x] [] [] []    Standing Dynamic [] [] [] [x] [] [] Standing balance activities during ADLs including reaching tasks, ant/post/lateral weightshifting, and unsupported standing with Mk to prevent LOB     GAIT: I Mod I S SBA CGA Min Mod Max Total  NT x2 Comments:   Level of Assistance [] [] [] [] [] [x] [] [] [] [] [] Cuing for upright posture and safe positioning inside RW   Distance 100 feet    DME Gait Belt and Rolling Walker    Gait Quality Antalgic, Decreased step clearance, Decreased step length, Decreased stance, Lurching, Narrow base of support and Trunk sway increased    Weightbearing Status Right Lower Extremity Weight Bearing: Weight Bearing As Tolerated  Left Lower Extremity Weight Bearing: Weight Bearing As Tolerated    Stairs      I=Independent, Mod I=Modified Independent, S=Supervision, SBA=Standby Assistance, CGA=Contact Guard Assistance,   Min=Minimal Assistance, Mod=Moderate Assistance, Max=Maximal Assistance, Total=Total Assistance, NT=Not Tested    PLAN:   ACUTE PHYSICAL THERAPY GOALS:   (Developed with and agreed upon by patient and/or caregiver.)  (1.) Monica Bernal  will move from supine to sit and sit to supine  with CONTACT GUARD ASSIST within 7 treatment day(s). (2.) Monica Bernal will transfer from bed to chair and chair to bed with MINIMAL ASSIST using the least restrictive device within 7 treatment day(s). (3.) Monica Bernal will ambulate with MINIMAL ASSIST for 25 feet with the least restrictive device within 7 treatment day(s). (4.) Monica Bernal will perform standing static and dynamic balance activities x 5 minutes with CONTACT GUARD ASSIST to improve safety within 7 treatment day(s). (5.) Monica Bernal will perform bilateral lower extremity exercises x 10 min for HEP with SUPERVISION to improve strength, endurance, and functional mobility within 7 treatment day(s). FREQUENCY AND DURATION: 3 times/week for duration of hospital stay or until stated goals are met, whichever comes first.    TREATMENT:   TREATMENT:   Therapeutic Activity (24 Minutes):  Therapeutic activity included Supine to Sit, Scooting, Transfer Training, Ambulation on level ground, Sitting balance  and Standing balance to improve functional Activity tolerance, Balance, Mobility and Strength.       TREATMENT GRID:  N/A    AFTER TREATMENT PRECAUTIONS: Alarm Activated, Bed/Chair Locked, Call light within reach, Chair, Needs within reach and Visitors at bedside    INTERDISCIPLINARY COLLABORATION:  RN/ PCT    EDUCATION:      TIME IN/OUT:  Time In: 1142  Time Out: [address]  [facility]

## 2022-07-06 NOTE — PLAN OF CARE
Problem: Discharge Planning  Goal: Discharge to home or other facility with appropriate resources  Outcome: Progressing  Flowsheets (Taken 7/5/2022 2130)  Discharge to home or other facility with appropriate resources: Identify barriers to discharge with patient and caregiver     Problem: Pain  Goal: Verbalizes/displays adequate comfort level or baseline comfort level  Outcome: Progressing     Problem: Skin/Tissue Integrity  Goal: Absence of new skin breakdown  Description: 1. Monitor for areas of redness and/or skin breakdown  2. Assess vascular access sites hourly  3. Every 4-6 hours minimum:  Change oxygen saturation probe site  4. Every 4-6 hours:  If on nasal continuous positive airway pressure, respiratory therapy assess nares and determine need for appliance change or resting period.   Outcome: Progressing     Problem: Safety - Adult  Goal: Free from fall injury  Outcome: Progressing  Flowsheets (Taken 7/5/2022 2130)  Free From Fall Injury: Instruct family/caregiver on patient safety     Problem: ABCDS Injury Assessment  Goal: Absence of physical injury  Outcome: Progressing  Flowsheets (Taken 7/5/2022 2130)  Absence of Physical Injury: Implement safety measures based on patient assessment

## 2022-07-07 VITALS
HEART RATE: 77 BPM | RESPIRATION RATE: 16 BRPM | TEMPERATURE: 97.9 F | HEIGHT: 61 IN | DIASTOLIC BLOOD PRESSURE: 62 MMHG | WEIGHT: 98 LBS | BODY MASS INDEX: 18.5 KG/M2 | SYSTOLIC BLOOD PRESSURE: 142 MMHG | OXYGEN SATURATION: 94 %

## 2022-07-07 LAB
SARS-COV-2 RDRP RESP QL NAA+PROBE: NOT DETECTED
SOURCE: NORMAL

## 2022-07-07 PROCEDURE — 97535 SELF CARE MNGMENT TRAINING: CPT

## 2022-07-07 PROCEDURE — 6370000000 HC RX 637 (ALT 250 FOR IP): Performed by: FAMILY MEDICINE

## 2022-07-07 PROCEDURE — 2580000003 HC RX 258: Performed by: FAMILY MEDICINE

## 2022-07-07 PROCEDURE — 87635 SARS-COV-2 COVID-19 AMP PRB: CPT

## 2022-07-07 PROCEDURE — 97116 GAIT TRAINING THERAPY: CPT

## 2022-07-07 PROCEDURE — 6360000002 HC RX W HCPCS: Performed by: FAMILY MEDICINE

## 2022-07-07 PROCEDURE — 97530 THERAPEUTIC ACTIVITIES: CPT

## 2022-07-07 PROCEDURE — 6370000000 HC RX 637 (ALT 250 FOR IP): Performed by: INTERNAL MEDICINE

## 2022-07-07 RX ORDER — POLYETHYLENE GLYCOL 3350 17 G/17G
17 POWDER, FOR SOLUTION ORAL DAILY
Qty: 527 G | Refills: 1 | Status: SHIPPED | OUTPATIENT
Start: 2022-07-08 | End: 2022-08-07

## 2022-07-07 RX ORDER — HYDROCODONE BITARTRATE AND ACETAMINOPHEN 5; 325 MG/1; MG/1
1 TABLET ORAL EVERY 6 HOURS PRN
Qty: 20 TABLET | Refills: 0 | Status: SHIPPED | OUTPATIENT
Start: 2022-07-07 | End: 2022-07-12

## 2022-07-07 RX ORDER — ENOXAPARIN SODIUM 100 MG/ML
30 INJECTION SUBCUTANEOUS DAILY
Qty: 30 EACH | Refills: 0 | Status: SHIPPED | OUTPATIENT
Start: 2022-07-08

## 2022-07-07 RX ORDER — ONDANSETRON 4 MG/1
4 TABLET, ORALLY DISINTEGRATING ORAL EVERY 8 HOURS PRN
Qty: 20 TABLET | Refills: 0 | Status: SHIPPED | OUTPATIENT
Start: 2022-07-07

## 2022-07-07 RX ORDER — MAGNESIUM HYDROXIDE/ALUMINUM HYDROXICE/SIMETHICONE 120; 1200; 1200 MG/30ML; MG/30ML; MG/30ML
30 SUSPENSION ORAL EVERY 6 HOURS PRN
Qty: 500 ML | Refills: 0 | Status: SHIPPED | OUTPATIENT
Start: 2022-07-07 | End: 2022-07-12

## 2022-07-07 RX ORDER — GABAPENTIN 100 MG/1
100 CAPSULE ORAL 3 TIMES DAILY PRN
Qty: 90 CAPSULE | Refills: 3 | Status: SHIPPED | OUTPATIENT
Start: 2022-07-07 | End: 2022-07-12

## 2022-07-07 RX ADMIN — ISOSORBIDE MONONITRATE 30 MG: 30 TABLET, EXTENDED RELEASE ORAL at 08:50

## 2022-07-07 RX ADMIN — ENOXAPARIN SODIUM 30 MG: 100 INJECTION SUBCUTANEOUS at 08:50

## 2022-07-07 RX ADMIN — PANTOPRAZOLE SODIUM 40 MG: 40 TABLET, DELAYED RELEASE ORAL at 05:18

## 2022-07-07 RX ADMIN — LOSARTAN POTASSIUM 50 MG: 50 TABLET, FILM COATED ORAL at 08:50

## 2022-07-07 RX ADMIN — CLOPIDOGREL BISULFATE 75 MG: 75 TABLET ORAL at 08:50

## 2022-07-07 RX ADMIN — SODIUM CHLORIDE, PRESERVATIVE FREE 10 ML: 5 INJECTION INTRAVENOUS at 08:51

## 2022-07-07 RX ADMIN — AMLODIPINE BESYLATE 10 MG: 10 TABLET ORAL at 08:50

## 2022-07-07 ASSESSMENT — PAIN DESCRIPTION - ORIENTATION: ORIENTATION: RIGHT;LEFT

## 2022-07-07 ASSESSMENT — PAIN SCALES - GENERAL
PAINLEVEL_OUTOF10: 5
PAINLEVEL_OUTOF10: 0

## 2022-07-07 ASSESSMENT — PAIN DESCRIPTION - LOCATION: LOCATION: HIP

## 2022-07-07 NOTE — PROGRESS NOTES
None    RESTRICTIONS/PRECAUTIONS:  Restrictions/Precautions  Restrictions/Precautions: Fall Risk,Weight Bearing (WBAT)  Lower Extremity Weight Bearing Restrictions  Right Lower Extremity Weight Bearing: Weight Bearing As Tolerated  Left Lower Extremity Weight Bearing: Weight Bearing As Tolerated  Restrictions/Precautions: Fall Risk,Weight Bearing (WBAT)     MOBILITY: I Mod I S SBA CGA Min Mod Max Total  NT x2 Comments:   Bed Mobility    Rolling [] [] [] [] [] [] [] [] [] [] []    Supine to Sit [] [] [] [] [x] [] [] [] [] [] [] Cuing to use bedrail and move LEs off bed   Scooting [] [] [] [] [] [] [] [] [] [] []    Sit to Supine [] [] [] [] [] [] [] [] [] [] []    Transfers    Sit to Stand [] [] [] [] [x] [] [] [] [] [] [] Cuing for hand placement and anterior weightshifting, x4 from EOB, elevated toilet, and recliner   Bed to Chair [] [] [] [] [] [] [] [] [] [] []    Stand to Sit [] [] [] [] [x] [] [] [] [] [] [] Cuing for hand placement, safe proximity to transfer surface, and controlled lowering    [] [] [] [] [] [] [] [] [] [] []    I=Independent, Mod I=Modified Independent, S=Supervision, SBA=Standby Assistance, CGA=Contact Guard Assistance,   Min=Minimal Assistance, Mod=Moderate Assistance, Max=Maximal Assistance, Total=Total Assistance, NT=Not Tested    BALANCE: Good Fair+ Fair Fair- Poor NT Comments   Sitting Static [x] [] [] [] [] []    Sitting Dynamic [x] [] [] [] []               Standing Static [] [] [x] [] [] []    Standing Dynamic [] [] [x] [] [] [] Standing balance activities during ADLs including reaching tasks, ant/post/lateral weightshifting, and unsupported standing with CGA this date     GAIT: I Mod I S SBA CGA Min Mod Max Total  NT x2 Comments:   Level of Assistance [] [] [] [] [] [x] [] [] [] [] [] Cuing for upright posture and safe positioning inside RW, cuing for decreased speed and obstacle negotiation for safety   Distance 250 feet    DME Gait Belt and Rolling Walker    Gait Quality Antalgic, Decreased step clearance, Decreased step length, Decreased stance, Lurching, Narrow base of support and Trunk sway increased    Weightbearing Status Right Lower Extremity Weight Bearing: Weight Bearing As Tolerated  Left Lower Extremity Weight Bearing: Weight Bearing As Tolerated    Stairs      I=Independent, Mod I=Modified Independent, S=Supervision, SBA=Standby Assistance, CGA=Contact Guard Assistance,   Min=Minimal Assistance, Mod=Moderate Assistance, Max=Maximal Assistance, Total=Total Assistance, NT=Not Tested    PLAN:   ACUTE PHYSICAL THERAPY GOALS:   (Developed with and agreed upon by patient and/or caregiver.)  (1.) Sriram David  will move from supine to sit and sit to supine  with CONTACT GUARD ASSIST within 7 treatment day(s). (2.) Sriram David will transfer from bed to chair and chair to bed with MINIMAL ASSIST using the least restrictive device within 7 treatment day(s). (3.) Sriram David will ambulate with MINIMAL ASSIST for 25 feet with the least restrictive device within 7 treatment day(s). (4.) Sriram David will perform standing static and dynamic balance activities x 5 minutes with CONTACT GUARD ASSIST to improve safety within 7 treatment day(s). (5.) Sriram David will perform bilateral lower extremity exercises x 10 min for HEP with SUPERVISION to improve strength, endurance, and functional mobility within 7 treatment day(s). FREQUENCY AND DURATION: 3 times/week for duration of hospital stay or until stated goals are met, whichever comes first.    TREATMENT:   TREATMENT:   Therapeutic Activity (14 Minutes): Therapeutic activity included Supine to Sit, Scooting, Transfer Training, Sitting balance  and Standing balance to improve functional Activity tolerance, Balance, Mobility and Strength. Gait training (11 minutes): Pre-gait activities including dynamic standing balance tasks. Gait training for 250 feet utilizing Gait Belt and [employer].  Patient required tactile and verbal cuing to improve activity pacing, assistive device utilization, dynamic standing balance, and gait mechanics.       TREATMENT GRID:  N/A    AFTER TREATMENT PRECAUTIONS: Bed/Chair Locked, Chair and RN at bedside    INTERDISCIPLINARY COLLABORATION:  RN/ PCT    EDUCATION:      TIME IN/OUT:  Time In: 1104  Time Out: [address]  Minutes: Alejo Ulloa PT

## 2022-07-07 NOTE — DISCHARGE SUMMARY
Hospitalist Discharge Summary   Admit Date:  7/3/2022  6:35 PM   DC Note date: 2022  Name:  Harinder Bess   Age:  80 y.o. Sex:  female  :  10/20/1928   MRN:  [id number]   Room:  [facility]  PCP:  Luis Weinstein MD    Presenting Complaint: Hip Pain     Initial Admission Diagnosis: Sacral insufficiency fracture, initial encounter [K52.69XJ]     Problem List for this Hospitalization (present on admission):    Principal Problem:    Sacral insufficiency fracture  Active Problems:    Bilateral carotid artery stenosis    Closed comminuted intertrochanteric fracture of proximal end of femur with routine healing, right    Pathological fracture of right hip due to age-related osteoporosis with routine healing    Urinary retention    Carotid artery disease (HCC)    Mixed hyperlipidemia    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 *    Did Patient have Sepsis (YES OR NO): NO    Hospital Course:  HPI:    Harinder Bess is a 80 y.o. 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 She went to sit on a stool in her porch and the stool scooted from under her and she fell onto her buttocks. No head injury or LOC. Found to have bilateral sacral insuffiency fractures, more pronounced on the right. No acute hip fracture found. CT also noted distended urinary bladder and large volume stool in the colon. rec'd morphine 2 mg in ED with some relief. Recently treated with bactrim for UTI. Son at bedside notes she cannot walk or bear weight on right leg. He has been carrying her and during transfers, she is having a lot of pain. Hospital Course:     For the full hx, please see the H+P but in summary, this is a 81 y/o WF with the above PMH who presented to [facility] with fall and increased R hip pain. The pt was admitted and found to have B/L sacral insuff fx's with no acute hip fx's. The pt was admitted and followed and found to have:    Sacral insufficiency fracture  07/06/22 - transfer to [address] [place] [address] [facility] PR[facility]  -PT/OT       Urinary retention   -   Resolved. Moss out 7-5       Constipation  -miralax daily  -dulcolax prn       Pathological fracture of right hip due to age-related osteoporosis with routine healing  -s/p closed reduction and CMN fixation 10/30/2020       Carotid artery disease (HCC)    Mixed hyperlipidemia  -cont statin, plavix       Essential hypertension   -Controlled. Continue home meds      The pt will be dc'd to [facility] with op f/u with PMD and Orthopaedics as op. The pt voiced understanding and agrees to f/u. Disposition: Low Na+, easy to chew. Diet: ADULT DIET; Easy to Chew  Code Status: Full Code    Follow Ups:   Contact information for after-discharge care     Discharge Destination     [address] [place] [address]) . Service: Skilled Nursing  Contact information:  [address] [place] [address]  [phone]                           Follow up labs/diagnostics (ultimately defer to outpatient provider): With PMD and Ortho      Time spent in patient discharge and coordination 38 minutes. Plan was discussed with pt and RN. All questions answered. Patient was stable at time of discharge. Instructions given to call a physician or return if any concerns. Current Discharge Medication List      START taking these medications    Details   HYDROcodone-acetaminophen (NORCO) 5-325 MG per tablet Take 1 tablet by mouth every 6 hours as needed for Pain for up to 5 days.   Qty: 20 tablet, Refills: 0    Comments: Reduce doses taken as pain becomes manageable  Associated Diagnoses: Pathological fracture of right hip due to age-related osteoporosis with routine healing      aluminum & magnesium hydroxide-simethicone (MAALOX) 200-200-20 MG/5ML SUSP suspension Take 30 mLs by mouth every 6 hours as needed for Indigestion  Qty: 500 mL, Refills: 0      enoxaparin Sodium (LOVENOX) 30 MG/0.3ML injection Inject 0.3 mLs into the skin daily  Qty: 30 each, Refills: 0      gabapentin (NEURONTIN) 100 MG capsule Take 1 capsule by mouth 3 times daily as needed (neuropathic pain) for up to 5 days.   Qty: 90 capsule, Refills: 3      ondansetron (ZOFRAN-ODT) 4 MG disintegrating tablet Take 1 tablet by mouth every 8 hours as needed for Nausea or Vomiting  Qty: 20 tablet, Refills: 0      bisacodyl (DULCOLAX) 5 MG EC tablet Take 2 tablets by mouth daily as needed for Constipation (If polyethylene glycol not effective)  Qty: 30 tablet, Refills: 0      polyethylene glycol (GLYCOLAX) 17 g packet Take 17 g by mouth daily  Qty: 527 g, Refills: 1         CONTINUE these medications which have NOT CHANGED    Details   amLODIPine (NORVASC) 10 MG tablet TAKE 1 TABLET BY MOUTH DAILY      Cholecalciferol 50 MCG (2000 UT) TABS Take by mouth      clopidogrel (PLAVIX) 75 MG tablet TAKE 1 TABLET BY MOUTH DAILY      cyclobenzaprine (FLEXERIL) 10 MG tablet Take 10 mg by mouth 2 times daily as needed      isosorbide mononitrate (IMDUR) 30 MG extended release tablet TAKE 1 TABLET BY MOUTH DAILY      losartan (COZAAR) 50 MG tablet TAKE 1 TABLET BY MOUTH DAILY      magnesium oxide (MAG-OX) 400 MG tablet Take 400 mg by mouth daily      pantoprazole (PROTONIX) 40 MG tablet TAKE 1 TABLET BY MOUTH DAILY      potassium chloride (KLOR-CON M) 10 MEQ extended release tablet TAKE 1 TABLET BY MOUTH DAILY      pravastatin (PRAVACHOL) 40 MG tablet TAKE 1 TABLET BY MOUTH NIGHTLY             Procedures done this admission:  * No surgery found *    Consults this admission:  IP CONSULT TO ORTHOPEDIC SURGERY  IP CONSULT TO CASE MANAGEMENT  IP CONSULT TO PHYSICAL THERAPY    Echocardiogram results:  No results found for this or any previous visit. Diagnostic Imaging/Tests:   XR LUMBAR SPINE (2-3 VIEWS)    Result Date: 7/3/2022  Clinical history: Fall TECHNIQUE: AP, lateral and coned-down lateral views of the lumbar spine. FINDINGS: Minimal grade 1 anterolisthesis of L4 on L5, due to degenerative changes. Alignment of the lumbar spine is otherwise maintained. There is no acute compression fracture. No dislocation. There is disc height loss at L4-L5 and L5-S1. Multilevel facet arthropathy. There is atherosclerotic calcifications of the abdominal aorta. Cholecystectomy clips are noted. 1. No acute fracture or dislocation in the lumbar spine. 2. Degenerative changes. CT HIP RIGHT WO CONTRAST    Result Date: 7/3/2022  Clinical history: Fall. Pain. TECHNIQUE: Axial, coronal and sagittal large field of view of the pelvis/hips. Axial right hip was obtained. Radiation dose reduction techniques were used for this study:  Our CT scanners use one or all of the following: Automated exposure control, adjustment of the mA and/or kVp according to patient's size, iterative reconstruction. Comparison: Hip x-ray earlier today. FINDINGS: Bones are osteopenic, limiting sensitivity. There are bilateral sacral insufficiency fractures, more pronounced on the right. The SI joints and pubic symphysis are intact. There is no acute pelvic fracture. Intramedullary divya and interlocking screws are present within the proximal to mid right femur. No acute right hip fracture is seen. There is no hip dislocation. There is moderate left hip joint osteoarthritis, including flattening of the femoral head and subchondral cystic changes. Urinary bladder is overdistended. There is large stool volume in the visualized colon. No free air or free fluid in the pelvis. 1. Bilateral sacral insufficiency fractures, more pronounced on the right. Osteopenia limits sensitivity. 2. Post surgical changes of the right femur.  No acute hip fracture is seen. No hip dislocation. 3. Moderate left hip joint osteoarthritis. 4. Overdistended urinary bladder. XR HIP 2-3 VW W PELVIS RIGHT    Result Date: 7/3/2022  Right Hip INDICATION: Pain after fall COMPARISON: None TECHNIQUE: Three views of the right hip were obtained FINDINGS: There are findings of intramedullary nailing of the proximal right femur. Intramedullary divya and interlocking screws are present. There is no acute fracture or dislocation of the right hip. There is right hip space narrowing. There is moderate left hip joint space narrowing. There is flattening of the left femoral head as well as subchondral sclerosis. 1. Post surgical changes of the right femur. No acute fracture or dislocation of the right hip. 2. Moderate to advanced left hip joint osteoarthritis. Labs: Results:       BMP, Mg, Phos No results for input(s): NA, K, CL, CO2, ANIONGAP, BUN, CREATININE, LABGLOM, GFRAA, CALCIUM, GLUCOSE in the last 72 hours. Invalid input(s): MAGNESIUM, PHOSPHOROUS   CBC No results for input(s): WBC, RBC, HGB, HCT, MCV, MCH, MCHC, RDW, PLT, MPV, NRBC, SEGS, LYMPHOPCT, EOSRELPCT, MONOPCT, BASOPCT, IMMGRAN, SEGSABS, LYMPHSABS, EOSABS, MONOSABS, BASOSABS, ABSIMMGRAN in the last 72 hours. LFT No results for input(s): BILITOT, BILIDIR, ALKPHOS, AST, ALT, PROT, LABALBU, GLOB in the last 72 hours.    Cardiac  No results found for: NTPROBNP, TROPHS   Coags No results found for: PROTIME, INR, APTT   A1c Lab Results   Component Value Date/Time    LABA1C 5.6 10/15/2019 10:21 AM     10/15/2019 10:21 AM      Lipids Lab Results   Component Value Date/Time    CHOL 149 10/15/2019 10:21 AM    LDLCALC 56 10/15/2019 10:21 AM    LABVLDL 11 10/15/2019 10:21 AM    HDL 82 10/15/2019 10:21 AM    TRIG 54 10/15/2019 10:21 AM      Thyroid  No results found for: Gustavo Maya     Most Recent UA Lab Results   Component Value Date/Time    WBCUA >100 06/16/2019 11:51 AM    RBCUA 0-3 06/16/2019 11:51 AM    BACTERIA 4+ wheezing, rhonchi, or rales. Respirations even, unlabored  Abdomen:   Soft, nontender, nondistended. Extremities: Warm and dry. No cyanosis or clubbing. No edema. Skin:     No rashes. Normal coloration  Neuro:  CN II-XII grossly intact. Psych:  Normal mood and affect. Signed:    Cj Srivastava. Catrachita Fuentes MD, Linda Conway  Internal Medicine - Hospitalist      Part of this note may have been written by using a voice dictation software. The note has been proof read but may still contain some grammatical/other typographical errors.

## 2022-07-07 NOTE — PLAN OF CARE
Problem: Discharge Planning  Goal: Discharge to home or other facility with appropriate resources  7/6/2022 2203 by Luis Workman RN  Outcome: Progressing  Flowsheets (Taken 7/6/2022 1940)  Discharge to home or other facility with appropriate resources: Identify barriers to discharge with patient and caregiver  7/6/2022 1137 by Bette Grossman RN  Outcome: Progressing  Flowsheets (Taken 7/6/2022 6052)  Discharge to home or other facility with appropriate resources: Identify barriers to discharge with patient and caregiver     Problem: Pain  Goal: Verbalizes/displays adequate comfort level or baseline comfort level  7/6/2022 2203 by Luis Workman RN  Outcome: Progressing  7/6/2022 1137 by Bette Grossman RN  Outcome: Progressing     Problem: Skin/Tissue Integrity  Goal: Absence of new skin breakdown  Description: 1. Monitor for areas of redness and/or skin breakdown  2. Assess vascular access sites hourly  3. Every 4-6 hours minimum:  Change oxygen saturation probe site  4. Every 4-6 hours:  If on nasal continuous positive airway pressure, respiratory therapy assess nares and determine need for appliance change or resting period.   7/6/2022 2203 by Luis Workman RN  Outcome: Progressing  7/6/2022 1137 by Bette Grossman RN  Outcome: Progressing     Problem: Safety - Adult  Goal: Free from fall injury  7/6/2022 2203 by Luis Workman RN  Outcome: Progressing  Flowsheets (Taken 7/6/2022 1940)  Free From Fall Injury: Instruct family/caregiver on patient safety  7/6/2022 1137 by Bette Grossman RN  Outcome: Progressing  Flowsheets (Taken 7/6/2022 0841)  Free From Fall Injury: Instruct family/caregiver on patient safety     Problem: ABCDS Injury Assessment  Goal: Absence of physical injury  7/6/2022 2203 by Luis Workman RN  Outcome: Progressing  Flowsheets (Taken 7/6/2022 1940)  Absence of Physical Injury: Implement safety measures based on patient assessment  7/6/2022 1137 by Bette Grossman RN  Outcome:

## 2022-07-07 NOTE — PROGRESS NOTES
OCCUPATIONAL THERAPY Daily Note     OT Visit Days: 3   Time  OT Charge Capture  Rehab Caseload Tracker  OT Orders    Geno Helm is a 80 y.o. female   PRIMARY DIAGNOSIS: Sacral insufficiency fracture  Sacral insufficiency fracture, initial encounter [M84.48XA]       Inpatient: Payor: MEDICARE / Plan: MEDICARE PART A AND B / Product Type: *No Product type* /     ASSESSMENT:     REHAB RECOMMENDATIONS: CURRENT LEVEL OF FUNCTION:  (Most Recently Demonstrated)   Recommendation to date pending progress:  Setting:   Short-term Rehab    Equipment:     To Be Determined Bathing:   Not Tested  Dressing:   Not Tested  Feeding/Grooming:   Supervision/Setup  Toileting:   Not Tested  Functional Mobility:   Not Tested-minimal assistance      ASSESSMENT:  Ms. General Carr is doing fair today. Pt presents supine upon arrival. Pt assisted with grooming task with sitting up in bed. Pt required only supervision to perform grooming. Minimal progress made today due to no OOB activities performed. Pt is to be discharged to SNF today.        SUBJECTIVE:     Ms. General Carr states, \"I would love to brush my teeth\"     Social/Functional Lives With: Alone  Type of Home: House  Home Layout: One level  Bathroom Shower/Tub: Tub/Shower unit  Receives Help From: Family  ADL Assistance: [facility]: Independent  Homemaking Responsibilities: Yes  Ambulation Assistance: Independent  Transfer Assistance: Independent  Occupation: Retired    OBJECTIVE:     LINES / Velvet Mix / Love Jessy: none    RESTRICTIONS/PRECAUTIONS:  Restrictions/Precautions  Restrictions/Precautions: Fall Risk,Weight Bearing (WBAT)  Lower Extremity Weight Bearing Restrictions  Right Lower Extremity Weight Bearing: Weight Bearing As Tolerated  Left Lower Extremity Weight Bearing: Weight Bearing As Tolerated        PAIN: VITALS / O2:   Pre Treatment:   None--denies pain         Post Treatment: no change  Vitals    stable throughout session      Oxygen   room air MOBILITY: I Mod I S SBA CGA Min Mod Max Total  NT x2 Comments:   Bed Mobility    Rolling [] [] [] [] [] [] [] [] [] [x] []    Supine to Sit [] [] [] [] [] [] [] [] [] [x] []    Scooting [] [] [] [] [] [] [] [] [] [x] []    Sit to Supine [] [] [] [] [] [] [] [] [] [x] []    Transfers    Sit to Stand [] [] [] [] [] [] [] [] [] [x] []    Bed to Chair [] [] [] [] [] [] [] [] [] [x] []    Stand to Sit [] [] [] [] [] [] [] [] [] [x] []    Tub/Shower [] [] [] [] [] [] [] [] [] [x] []     Toilet [] [] [] [] [] [] [] [] [] [x] []     [] [] [] [] [] [] [] [] [] [] []    I=Independent, Mod I=Modified Independent, S=Supervision/Setup, SBA=Standby Assistance, CGA=Contact Guard Assistance, Min=Minimal Assistance, Mod=Moderate Assistance, Max=Maximal Assistance, Total=Total Assistance, NT=Not Tested    ACTIVITIES OF DAILY LIVING: I Mod I S SBA CGA Min Mod Max Total NT Comments   BASIC ADLs:              Upper Body   Bathing [] [] [] [] [] [] [] [] [] [x]    Lower Body Bathing [] [] [] [] [] [] [] [] [] [x]    Toileting [] [] [] [] [] [] [] [] [] [x]    Upper Body Dressing [] [] [] [] [] [] [] [] [] [x]    Lower Body Dressing [] [] [] [] [] [] [] [] [] [x]    Feeding [] [] [] [] [] [] [] [] [] [x]    Grooming [] [] [x] [] [] [] [] [] [] [x]    Personal Device Care [] [] [] [] [] [] [] [] [] [x]    Functional Mobility [] [] [] [] [] [] [] [] [] [x]    I=Independent, Mod I=Modified Independent, S=Supervision/Setup, SBA=Standby Assistance, CGA=Contact Guard Assistance, Min=Minimal Assistance, Mod=Moderate Assistance, Max=Maximal Assistance, Total=Total Assistance, NT=Not Tested    BALANCE: Good Fair+ Fair Fair- Poor NT Comments   Sitting Static [] [x] [] [] [] []    Sitting Dynamic [] [x] [] [] [] []              Standing Static [] [] [] [] [] [x]    Standing Dynamic [] [] [] [] [] [x]        PLAN:     FREQUENCY/DURATION   OT Plan of Care: 3 times/week for duration of hospital stay or until stated goals are met, whichever comes first.    ACUTE OCCUPATIONAL THERAPY GOALS:   (Developed with and agreed upon by patient and/or caregiver.)  1. Patient will complete lower body bathing and dressing with MIN A and adaptive equipment as needed. 2.Patient will complete upper body bathing and dressing with MOD I and adaptive equipment as needed. 3. Patient will complete toileting with MIN A.   4. Patient will tolerate 25 minutes of OT treatment with 1-2 rest breaks to increase activity tolerance for ADLs. 5. Patient will complete functional transfers with CGA and adaptive equipment as needed. 6. Patient will complete functional activity with MOD I and adaptive equipment as needed.     Timeframe: 7 visits     TREATMENT:     TREATMENT:   Self Care (10 minutes): Patient participated in grooming ADLs in supported sitting with minimal tactile cueing to increase independence and decrease assistance required. Patient also participated in functional mobility and functional transfer training to increase independence and decrease assistance required.      TREATMENT GRID:  N/A    AFTER TREATMENT PRECAUTIONS: Bed, Bed/Chair Locked, Call light within reach and Needs within reach    INTERDISCIPLINARY COLLABORATION:  RN/ PCT and OT/ MUNOZ    EDUCATION:       TOTAL TREATMENT DURATION AND TIME:  Time In: 0920  Time Out: 0930  Minutes: [address] [facility] [address], SIOBHAN

## 2022-07-07 NOTE — PROGRESS NOTES
TRANSFER - OUT REPORT:    Verbal report given to ANGELLA White on [employer]  being transferred to [facility] for routine progression of patient care       Report consisted of patient's Situation, Background, Assessment and   Recommendations(SBAR). Information from the following report(s) Nurse Handoff Report was reviewed with the receiving nurse. Lines:       Opportunity for questions and clarification was provided.

## 2022-07-07 NOTE — CARE COORDINATION
Pt is medically cleared for dc today and will transfer to room 321B at [facility] for STR services. RN to call report to #[phone]. Transport scheduled for 1200 through [employer]. CM spoke with pt's dtr, Tyler Barriga, via phone to update her with this information. Pt also notified. CM remains available to assist if needed. 07/07/22 1031   Service Assessment   Patient Orientation Alert and Oriented   Cognition Alert   History Provided By Patient;Medical Record   Primary Caregiver Family   Support Systems Family Members; Children   PCP Verified by CM Yes   Last Visit to PCP Within last 3 months   Prior Functional Level Independent in ADLs/IADLs   Current Functional Level Assistance with the following:;Bathing;Dressing; Toileting;Cooking;Housework; Shopping;Mobility   Can patient return to prior living arrangement Yes   Ability to make needs known: Good   Family able to assist with home care needs: No   Social/Functional History   Lives With Alone   Type of [address] One level   Bathroom Shower/Tub Tub/Shower unit   Receives Help From Family   ADL Assistance Independent   Homemaking Assistance Independent   Homemaking Responsibilities Yes   Ambulation Assistance Independent   Transfer Assistance Independent   Occupation Retired   Discharge Planning   Type of [address] Prior To Admission None   [address]   DME Ordered? No   Potential Assistance Purchasing Medications No   Type of Home Care Services None   Patient expects to be discharged to: Skilled nursing facility   History of falls? 1   Services At/After Discharge   Transition of Care Consult (CM Consult) [facility]   Partner SNF Yes   Services At/After Discharge [facility] (SNF); Transport; In ambulance;OT;PT;Nursing services   [address] Provided?  No   Mode of Transport at Discharge [facility] Time of Discharge 1200   Confirm Follow Up Transport Family   Condition of Participation: Discharge Planning   The Plan for Transition of Care is related to the following treatment goals: STR to improve pt's strength and functional abilities for a safe transition to home   The Patient and/or Patient Representative was provided with a Choice of Provider? Patient   The Patient and/Or Patient Representative agree with the Discharge Plan? Yes   Freedom of Choice list was provided with basic dialogue that supports the patient's individualized plan of care/goals, treatment preferences, and shares the quality data associated with the providers?   No  (pt requested referral to a specific facility)

## 2022-07-08 ENCOUNTER — CARE COORDINATION (OUTPATIENT)
Dept: CARE COORDINATION | Facility: CLINIC | Age: 87
End: 2022-07-08

## 2022-07-08 NOTE — CARE COORDINATION
Patient discharged to a SNF Preferred Provider Network facility. Patient will be included in weekly care coordination calls. Message sent to Christofer Vance RN SNF Preferred Provider [id number].

## 2022-07-13 ENCOUNTER — CARE COORDINATION (OUTPATIENT)
Dept: CARE COORDINATION | Facility: CLINIC | Age: 87
End: 2022-07-13

## 2022-07-13 NOTE — CARE COORDINATION
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 Regular diet     ADLs: Maximum Assistance   Bed Mobility: Moderate Assistance   Transfer Assistance: Moderate Assistance   Ambulation Assistance:  Moderate Assistance   How far (in feet) is the patient ambulating?: 20   Does patient use an assistive device?: Yes (Comment: RW)   Assistive Devices: RW   Anticipated discharge services: Home with [address] [place] [address]

## 2022-07-20 ENCOUNTER — CARE COORDINATION (OUTPATIENT)
Dept: CARE COORDINATION | Facility: CLINIC | Age: 87
End: 2022-07-20

## 2022-07-20 NOTE — CARE COORDINATION
[facility] Update Call [facility]    2022    Patient: Reta Carl   Patient : 10/20/1928   MRN: [id number]    Reason for Admission: Sacral insufficiency fracture  Discharge Date: 22        Care Transitions Post Acute Facility Update    Care Transitions Interventions  Post Acute Facility Update  Reported Nursing Issues:  Keflex 500mg bid r/t cellulitis.  Labs ordered for CBC, CMP, lipid profile and Magnesium.  Same diet, V/S Stable     ADLs: Moderate Assistance   Bed Mobility: Minimal Assistance   Transfer Assistance: Contact Guard Assist - Hands on patient for balance   Ambulation Assistance: [address] on patient for balance   How far (in feet) is the patient ambulating?: 75   Does patient use an assistive device?: Yes (Comment: RW)   Assistive Devices: RW   Anticipated discharge services: Home with [facility]

## 2022-07-26 ENCOUNTER — CARE COORDINATION (OUTPATIENT)
Dept: CARE COORDINATION | Facility: CLINIC | Age: 87
End: 2022-07-26

## 2022-07-27 ENCOUNTER — CARE COORDINATION (OUTPATIENT)
Dept: CARE COORDINATION | Facility: CLINIC | Age: 87
End: 2022-07-27